# Patient Record
Sex: FEMALE | Race: WHITE | NOT HISPANIC OR LATINO | URBAN - METROPOLITAN AREA
[De-identification: names, ages, dates, MRNs, and addresses within clinical notes are randomized per-mention and may not be internally consistent; named-entity substitution may affect disease eponyms.]

---

## 2017-01-10 ENCOUNTER — INPATIENT (INPATIENT)
Facility: HOSPITAL | Age: 47
LOS: 3 days | Discharge: HOME | End: 2017-01-14
Attending: INTERNAL MEDICINE | Admitting: FAMILY MEDICINE

## 2017-01-24 ENCOUNTER — APPOINTMENT (OUTPATIENT)
Dept: CARDIOLOGY | Facility: CLINIC | Age: 47
End: 2017-01-24

## 2017-04-04 ENCOUNTER — INPATIENT (INPATIENT)
Facility: HOSPITAL | Age: 47
LOS: 3 days | Discharge: HOME | End: 2017-04-08
Attending: FAMILY MEDICINE | Admitting: FAMILY MEDICINE

## 2017-05-04 ENCOUNTER — OUTPATIENT (OUTPATIENT)
Dept: OUTPATIENT SERVICES | Facility: HOSPITAL | Age: 47
LOS: 1 days | Discharge: HOME | End: 2017-05-04

## 2017-06-28 DIAGNOSIS — F33.2 MAJOR DEPRESSIVE DISORDER, RECURRENT SEVERE WITHOUT PSYCHOTIC FEATURES: ICD-10-CM

## 2017-07-07 DIAGNOSIS — F41.9 ANXIETY DISORDER, UNSPECIFIED: ICD-10-CM

## 2017-07-07 DIAGNOSIS — R47.81 SLURRED SPEECH: ICD-10-CM

## 2017-07-07 DIAGNOSIS — R42 DIZZINESS AND GIDDINESS: ICD-10-CM

## 2017-07-07 DIAGNOSIS — K21.9 GASTRO-ESOPHAGEAL REFLUX DISEASE WITHOUT ESOPHAGITIS: ICD-10-CM

## 2017-07-07 DIAGNOSIS — E86.0 DEHYDRATION: ICD-10-CM

## 2017-07-07 DIAGNOSIS — R53.1 WEAKNESS: ICD-10-CM

## 2017-07-07 DIAGNOSIS — R55 SYNCOPE AND COLLAPSE: ICD-10-CM

## 2017-07-07 DIAGNOSIS — D51.0 VITAMIN B12 DEFICIENCY ANEMIA DUE TO INTRINSIC FACTOR DEFICIENCY: ICD-10-CM

## 2017-07-07 DIAGNOSIS — G35 MULTIPLE SCLEROSIS: ICD-10-CM

## 2017-07-07 DIAGNOSIS — I47.1 SUPRAVENTRICULAR TACHYCARDIA: ICD-10-CM

## 2017-07-07 DIAGNOSIS — Z87.891 PERSONAL HISTORY OF NICOTINE DEPENDENCE: ICD-10-CM

## 2017-08-07 ENCOUNTER — APPOINTMENT (OUTPATIENT)
Dept: CARDIOLOGY | Facility: CLINIC | Age: 47
End: 2017-08-07

## 2017-08-07 VITALS — SYSTOLIC BLOOD PRESSURE: 120 MMHG | DIASTOLIC BLOOD PRESSURE: 80 MMHG | HEART RATE: 60 BPM

## 2017-08-07 VITALS — WEIGHT: 162 LBS | BODY MASS INDEX: 24.55 KG/M2 | HEIGHT: 68 IN

## 2017-08-15 ENCOUNTER — EMERGENCY (EMERGENCY)
Facility: HOSPITAL | Age: 47
LOS: 0 days | Discharge: HOME | End: 2017-08-15
Admitting: FAMILY MEDICINE
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
## 2017-08-15 DIAGNOSIS — M54.2 CERVICALGIA: ICD-10-CM

## 2017-08-15 DIAGNOSIS — Z79.899 OTHER LONG TERM (CURRENT) DRUG THERAPY: ICD-10-CM

## 2017-08-15 DIAGNOSIS — F41.9 ANXIETY DISORDER, UNSPECIFIED: ICD-10-CM

## 2017-08-15 DIAGNOSIS — S16.1XXA STRAIN OF MUSCLE, FASCIA AND TENDON AT NECK LEVEL, INITIAL ENCOUNTER: ICD-10-CM

## 2017-08-15 DIAGNOSIS — Z91.012 ALLERGY TO EGGS: ICD-10-CM

## 2017-08-15 DIAGNOSIS — R55 SYNCOPE AND COLLAPSE: ICD-10-CM

## 2017-08-15 DIAGNOSIS — K21.9 GASTRO-ESOPHAGEAL REFLUX DISEASE WITHOUT ESOPHAGITIS: ICD-10-CM

## 2017-08-15 DIAGNOSIS — D51.0 VITAMIN B12 DEFICIENCY ANEMIA DUE TO INTRINSIC FACTOR DEFICIENCY: ICD-10-CM

## 2017-08-15 DIAGNOSIS — Z88.8 ALLERGY STATUS TO OTHER DRUGS, MEDICAMENTS AND BIOLOGICAL SUBSTANCES STATUS: ICD-10-CM

## 2017-08-15 DIAGNOSIS — Z91.013 ALLERGY TO SEAFOOD: ICD-10-CM
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
## 2017-08-15 DIAGNOSIS — G35 MULTIPLE SCLEROSIS: ICD-10-CM
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
## 2017-08-18 ENCOUNTER — TRANSCRIPTION ENCOUNTER (OUTPATIENT)
Age: 47
End: 2017-08-18

## 2017-09-21 ENCOUNTER — APPOINTMENT (OUTPATIENT)
Dept: CARDIOLOGY | Facility: CLINIC | Age: 47
End: 2017-09-21

## 2017-12-14 ENCOUNTER — EMERGENCY (EMERGENCY)
Facility: HOSPITAL | Age: 47
LOS: 0 days | Discharge: HOME | End: 2017-12-14
Admitting: FAMILY MEDICINE
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
## 2017-12-14 DIAGNOSIS — S16.1XXA STRAIN OF MUSCLE, FASCIA AND TENDON AT NECK LEVEL, INITIAL ENCOUNTER: ICD-10-CM
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
## 2017-12-14 DIAGNOSIS — D51.0 VITAMIN B12 DEFICIENCY ANEMIA DUE TO INTRINSIC FACTOR DEFICIENCY: ICD-10-CM

## 2017-12-14 DIAGNOSIS — Z91.013 ALLERGY TO SEAFOOD: ICD-10-CM

## 2017-12-14 DIAGNOSIS — M54.2 CERVICALGIA: ICD-10-CM

## 2017-12-14 DIAGNOSIS — R55 SYNCOPE AND COLLAPSE: ICD-10-CM

## 2017-12-14 DIAGNOSIS — F41.9 ANXIETY DISORDER, UNSPECIFIED: ICD-10-CM

## 2017-12-14 DIAGNOSIS — K21.9 GASTRO-ESOPHAGEAL REFLUX DISEASE WITHOUT ESOPHAGITIS: ICD-10-CM

## 2017-12-14 DIAGNOSIS — G35 MULTIPLE SCLEROSIS: ICD-10-CM
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
## 2017-12-14 DIAGNOSIS — Z91.040 LATEX ALLERGY STATUS: ICD-10-CM

## 2017-12-14 DIAGNOSIS — Z79.899 OTHER LONG TERM (CURRENT) DRUG THERAPY: ICD-10-CM

## 2018-06-11 ENCOUNTER — APPOINTMENT (OUTPATIENT)
Dept: OTOLARYNGOLOGY | Facility: CLINIC | Age: 48
End: 2018-06-11
Payer: COMMERCIAL

## 2018-06-11 DIAGNOSIS — H92.09 OTALGIA, UNSPECIFIED EAR: ICD-10-CM

## 2018-06-11 DIAGNOSIS — R04.0 EPISTAXIS: ICD-10-CM

## 2018-06-11 PROCEDURE — 31231 NASAL ENDOSCOPY DX: CPT

## 2018-06-11 PROCEDURE — 92550 TYMPANOMETRY & REFLEX THRESH: CPT

## 2018-06-11 PROCEDURE — 99204 OFFICE O/P NEW MOD 45 MIN: CPT | Mod: 25

## 2018-06-19 ENCOUNTER — APPOINTMENT (OUTPATIENT)
Dept: CARDIOLOGY | Facility: CLINIC | Age: 48
End: 2018-06-19

## 2018-07-13 ENCOUNTER — APPOINTMENT (OUTPATIENT)
Dept: CARDIOLOGY | Facility: CLINIC | Age: 48
End: 2018-07-13

## 2018-07-13 VITALS — DIASTOLIC BLOOD PRESSURE: 70 MMHG | SYSTOLIC BLOOD PRESSURE: 130 MMHG

## 2018-07-13 VITALS — HEIGHT: 68 IN | HEART RATE: 60 BPM | WEIGHT: 168 LBS | BODY MASS INDEX: 25.46 KG/M2

## 2018-07-13 VITALS — OXYGEN SATURATION: 99 %

## 2018-07-27 ENCOUNTER — APPOINTMENT (OUTPATIENT)
Dept: CARDIOLOGY | Facility: CLINIC | Age: 48
End: 2018-07-27

## 2018-08-14 ENCOUNTER — OTHER (OUTPATIENT)
Age: 48
End: 2018-08-14

## 2018-09-06 ENCOUNTER — OUTPATIENT (OUTPATIENT)
Dept: OUTPATIENT SERVICES | Facility: HOSPITAL | Age: 48
LOS: 1 days | Discharge: HOME | End: 2018-09-06

## 2018-09-06 ENCOUNTER — APPOINTMENT (OUTPATIENT)
Dept: OTOLARYNGOLOGY | Facility: CLINIC | Age: 48
End: 2018-09-06

## 2018-09-07 ENCOUNTER — EMERGENCY (EMERGENCY)
Facility: HOSPITAL | Age: 48
LOS: 0 days | Discharge: HOME | End: 2018-09-08
Attending: EMERGENCY MEDICINE | Admitting: EMERGENCY MEDICINE

## 2018-09-07 VITALS
RESPIRATION RATE: 18 BRPM | SYSTOLIC BLOOD PRESSURE: 165 MMHG | HEART RATE: 91 BPM | TEMPERATURE: 98 F | DIASTOLIC BLOOD PRESSURE: 72 MMHG | OXYGEN SATURATION: 100 %

## 2018-09-07 DIAGNOSIS — Z88.8 ALLERGY STATUS TO OTHER DRUGS, MEDICAMENTS AND BIOLOGICAL SUBSTANCES: ICD-10-CM

## 2018-09-07 DIAGNOSIS — R00.2 PALPITATIONS: ICD-10-CM

## 2018-09-07 DIAGNOSIS — Z91.040 LATEX ALLERGY STATUS: ICD-10-CM

## 2018-09-07 DIAGNOSIS — G35 MULTIPLE SCLEROSIS: ICD-10-CM

## 2018-09-07 DIAGNOSIS — Z91.013 ALLERGY TO SEAFOOD: ICD-10-CM

## 2018-09-07 LAB
ALBUMIN SERPL ELPH-MCNC: 4.3 G/DL — SIGNIFICANT CHANGE UP (ref 3.5–5.2)
ALP SERPL-CCNC: 100 U/L — SIGNIFICANT CHANGE UP (ref 30–115)
ALT FLD-CCNC: 12 U/L — SIGNIFICANT CHANGE UP (ref 0–41)
ANION GAP SERPL CALC-SCNC: 19 MMOL/L — HIGH (ref 7–14)
AST SERPL-CCNC: 17 U/L — SIGNIFICANT CHANGE UP (ref 0–41)
BASOPHILS # BLD AUTO: 0.02 K/UL — SIGNIFICANT CHANGE UP (ref 0–0.2)
BASOPHILS NFR BLD AUTO: 0.3 % — SIGNIFICANT CHANGE UP (ref 0–1)
BILIRUB SERPL-MCNC: 0.4 MG/DL — SIGNIFICANT CHANGE UP (ref 0.2–1.2)
BUN SERPL-MCNC: 11 MG/DL — SIGNIFICANT CHANGE UP (ref 10–20)
CALCIUM SERPL-MCNC: 8.7 MG/DL — SIGNIFICANT CHANGE UP (ref 8.5–10.1)
CHLORIDE SERPL-SCNC: 105 MMOL/L — SIGNIFICANT CHANGE UP (ref 98–110)
CK SERPL-CCNC: 48 U/L — SIGNIFICANT CHANGE UP (ref 0–225)
CO2 SERPL-SCNC: 19 MMOL/L — SIGNIFICANT CHANGE UP (ref 17–32)
CREAT SERPL-MCNC: 0.8 MG/DL — SIGNIFICANT CHANGE UP (ref 0.7–1.5)
EOSINOPHIL # BLD AUTO: 0.01 K/UL — SIGNIFICANT CHANGE UP (ref 0–0.7)
EOSINOPHIL NFR BLD AUTO: 0.2 % — SIGNIFICANT CHANGE UP (ref 0–8)
GLUCOSE SERPL-MCNC: 109 MG/DL — HIGH (ref 70–99)
HCT VFR BLD CALC: 42.1 % — SIGNIFICANT CHANGE UP (ref 37–47)
HGB BLD-MCNC: 14.2 G/DL — SIGNIFICANT CHANGE UP (ref 12–16)
IMM GRANULOCYTES NFR BLD AUTO: 0.3 % — SIGNIFICANT CHANGE UP (ref 0.1–0.3)
LYMPHOCYTES # BLD AUTO: 1.45 K/UL — SIGNIFICANT CHANGE UP (ref 1.2–3.4)
LYMPHOCYTES # BLD AUTO: 23 % — SIGNIFICANT CHANGE UP (ref 20.5–51.1)
MAGNESIUM SERPL-MCNC: 2 MG/DL — SIGNIFICANT CHANGE UP (ref 1.8–2.4)
MCHC RBC-ENTMCNC: 29 PG — SIGNIFICANT CHANGE UP (ref 27–31)
MCHC RBC-ENTMCNC: 33.7 G/DL — SIGNIFICANT CHANGE UP (ref 32–37)
MCV RBC AUTO: 85.9 FL — SIGNIFICANT CHANGE UP (ref 81–99)
MONOCYTES # BLD AUTO: 0.3 K/UL — SIGNIFICANT CHANGE UP (ref 0.1–0.6)
MONOCYTES NFR BLD AUTO: 4.8 % — SIGNIFICANT CHANGE UP (ref 1.7–9.3)
NEUTROPHILS # BLD AUTO: 4.5 K/UL — SIGNIFICANT CHANGE UP (ref 1.4–6.5)
NEUTROPHILS NFR BLD AUTO: 71.4 % — SIGNIFICANT CHANGE UP (ref 42.2–75.2)
NRBC # BLD: 0 /100 WBCS — SIGNIFICANT CHANGE UP (ref 0–0)
PLATELET # BLD AUTO: 160 K/UL — SIGNIFICANT CHANGE UP (ref 130–400)
POTASSIUM SERPL-MCNC: 4.6 MMOL/L — SIGNIFICANT CHANGE UP (ref 3.5–5)
POTASSIUM SERPL-SCNC: 4.6 MMOL/L — SIGNIFICANT CHANGE UP (ref 3.5–5)
PROT SERPL-MCNC: 6.7 G/DL — SIGNIFICANT CHANGE UP (ref 6–8)
RBC # BLD: 4.9 M/UL — SIGNIFICANT CHANGE UP (ref 4.2–5.4)
RBC # FLD: 12.9 % — SIGNIFICANT CHANGE UP (ref 11.5–14.5)
SODIUM SERPL-SCNC: 143 MMOL/L — SIGNIFICANT CHANGE UP (ref 135–146)
TROPONIN T SERPL-MCNC: <0.01 NG/ML — SIGNIFICANT CHANGE UP
TROPONIN T SERPL-MCNC: <0.01 NG/ML — SIGNIFICANT CHANGE UP
WBC # BLD: 6.3 K/UL — SIGNIFICANT CHANGE UP (ref 4.8–10.8)
WBC # FLD AUTO: 6.3 K/UL — SIGNIFICANT CHANGE UP (ref 4.8–10.8)

## 2018-09-07 NOTE — ED CDU PROVIDER INITIAL DAY NOTE - PHYSICAL EXAMINATION
Vital Signs: I have reviewed the initial vital signs.  Constitutional: well-nourished, appears stated age, no acute distress  Cardiovascular: regular rate, regular rhythm, well-perfused extremities  Respiratory: unlabored respiratory effort, clear to auscultation bilaterally  Gastrointestinal: soft, non-tender abdomen, no pulsatile mass  Musculoskeletal: no lower extremity edema, no calf tenderness  Integumentary: warm, dry, no rash  Neurologic: awake, alert, cranial nerves II-XII grossly intact, extremities’ motor and sensory functions grossly intact  Psychiatric: appropriate mood, appropriate affect

## 2018-09-07 NOTE — ED PROVIDER NOTE - OBJECTIVE STATEMENT
46 Y/O F MS, NONSMOKER C/O PALPITATIONS THIS AM. PT FELT "IRREGULAR HEART BEAT." + ALVAREZ X 2 DAYS. PT REPORTS INTERMITTENT "TWINGES" OF CHEST PAIN X SEVERAL WEEKS. NO CP OR SOB CURRENTLY. PT HAD NORMAL STRESS TEST AND CCTA LAST YEAR. PT HAD HOLTER MONITOR WHICH REVEALED A FLUTTER AND SVT AND PT WAS EVALUATED BY EP. ABLATION WAS RECOMMENDED AND PT REFUSED. PT UNDER INCREASED STRESS WHEN HER STEP SON  10 MONTHS AGO. NO LEG EDEMA OR PAIN. NO ABD PAIN OR BACK PAIN.

## 2018-09-07 NOTE — CONSULT NOTE ADULT - SUBJECTIVE AND OBJECTIVE BOX
HPI:  Patient is a 47y old  Female with history of multiple sclerosis, orthostatic hypotension, PSVT/palpitations was told of the need for EP study in the past and possible ablation but has refused, who this morning  developed palpitations and vague CP radiating to neck. It started a 4AM and has been intermittent. She was unable to take medication in the past in view of her low  to low normal BP .     PAST MEDICAL & SURGICAL HISTORY:  Ovarian cyst  Pernicious anemia  MS (multiple sclerosis)      PREVIOUS DIAGNOSTIC TESTING:      ECHO  FINDINGS:7-18 Normal LV systolic function Trace MR Mild TR     CTA of the coronary arteries No CAD CAC score 0    STRESS  FINDINGS:    CATHETERIZATION  FINDINGS:    MEDICATIONS  (STANDING):    MEDICATIONS  (PRN):      FAMILY HISTORY:      SOCIAL HISTORY:  CIGARETTES:    ALCOHOL:    Allergies    epinephrine (Other (Severe))  latex (Other)  shellfish (Other (Moderate))    Intolerances        REVIEW OF SYSTEMS:  CONSTITUTIONAL: No fever, weight loss, or fatigue  EYES: No eye pain, visual disturbances, or discharge  ENMT:  No difficulty hearing, tinnitus, vertigo; No sinus or throat pain  NECK: No pain or stiffness  BREASTS: No pain, masses, or nipple discharge  RESPIRATORY: No cough, wheezing, chills or hemoptysis; No shortness of breath  CARDIOVASCULAR: No chest pain, palpitations, dizziness, or leg swelling  GASTROINTESTINAL: No abdominal or epigastric pain. No nausea, vomiting, or hematemesis; No diarrhea or constipation. No melena or hematochezia.  GENITOURINARY: No dysuria, frequency, hematuria, or incontinence  NEUROLOGICAL: No headaches, memory loss, loss of strength, numbness, or tremors  SKIN: No itching, burning, rashes, or lesions   LYMPH NODES: No enlarged glands  ENDOCRINE: No heat or cold intolerance; No hair loss  MUSCULOSKELETAL: No joint pain or swelling; No muscle, back, or extremity pain  PSYCHIATRIC: No depression, anxiety, mood swings, or difficulty sleeping  HEME/LYMPH: No easy bruising, or bleeding gums  ALLERY AND IMMUNOLOGIC: No hives or eczema          Vital Signs Last 24 Hrs  T(C): 36.6 (07 Sep 2018 07:26), Max: 36.6 (07 Sep 2018 07:26)  T(F): 97.8 (07 Sep 2018 07:26), Max: 97.8 (07 Sep 2018 07:26)  HR: 91 (07 Sep 2018 07:26) (91 - 91)  BP: 165/72 (07 Sep 2018 07:26) (165/72 - 165/72)  BP(mean): --  RR: 18 (07 Sep 2018 07:26) (18 - 18)  SpO2: 100% (07 Sep 2018 07:26) (100% - 100%)        PHYSICAL EXAM:  GENERAL: NAD, well-groomed, well-developed  HEAD:  Atraumatic, Normocephalic  EYES: EOMI, PERRLA, conjunctiva and sclera clear  ENMT: No tonsillar erythema, exudates, or enlargement; Moist mucous membranes, Good dentition, No lesions  NECK: Supple, No JVD, Normal thyroid  NERVOUS SYSTEM:  Alert & Oriented X3, Good concentration; Motor Strength 5/5 B/L upper and lower extremities; DTRs 2+ intact and symmetric  CHEST/LUNG: Clear to percussion bilaterally; No rales, rhonchi, wheezing, or rubs  HEART: Regular rate and rhythm; No murmurs, rubs, or gallops  ABDOMEN: Soft, Nontender, Nondistended; Bowel sounds present  EXTREMITIES:  2+ Peripheral Pulses, No clubbing, cyanosis, or edema  LYMPH: No lymphadenopathy noted  SKIN: No rashes or lesions    INTERPRETATION OF TELEMETRY:    ECG:NSR NS ST-T changes     I&O's Detail      LABS:                        14.2   6.30  )-----------( 160      ( 07 Sep 2018 08:12 )             42.1           CARDIAC MARKERS ( 07 Sep 2018 08:12 )  x     / <0.01 ng/mL / x     / x     / x              I&O's Summary      RADIOLOGY & ADDITIONAL STUDIES:

## 2018-09-07 NOTE — ED PROVIDER NOTE - MEDICAL DECISION MAKING DETAILS
46 Y/O F H/O SVT, REFUSED ABLATION WITH PALPITATIONS AND SOB. EKG WITH NO ARRYTHMIA. ALL LABS REVIEWED. DR. RAMOS EVALUATED PT IN THE ED. PT T BE TRANSFERRED TO [facility] FOR MONITORING.

## 2018-09-07 NOTE — ED CDU PROVIDER INITIAL DAY NOTE - PROGRESS NOTE DETAILS
received signout from Dr. Nunez - pt seen by cardiology Dr. Shepard - plan: ce negative x 2; observe overnight for arrythmias(pt with sx of nsvt in past); if no arrythmias d/c in am for outpt workup;

## 2018-09-07 NOTE — ED PROVIDER NOTE - PROGRESS NOTE DETAILS
PT REFUSED CXR, RISKS EXPLAINED. PT VERBALIZED UNDERSTANDING.  AT BEDSIDE. CASE D/W DR. RAMOS, RECOMMENDS [facility] FOR MONITORING CASE D/W DR. RAMOS, DR. RAMOS IN ED TO EVALUATE PT CASE D/W DR. RENDON, WILL TRANSFER TO [facility]

## 2018-09-07 NOTE — CONSULT NOTE ADULT - ASSESSMENT
-Palpitations - intermittent. In NSR in ER . CE negative x1. No CAD at recent CTA of the coronary arteries . She has suspected PSVT in the past. She has refused EP and possible ablation   -Multiple Sclerosis  -Anxiety   -Known hiatal hernia     Plan:  Monitor in observation  If no arrythmia overnight may discharge in AM with Long term event monitor as outpatient  Discussed with ER staff

## 2018-09-07 NOTE — ED ADULT NURSE REASSESSMENT NOTE - NS ED NURSE REASSESS COMMENT FT1
Pt assessed, VSS, no complaints offered at this time, pt is currently on continuous cardiac monitoring with HR of 72. Pt is a&ox4, steady gait neuro intact. safety measures in place, call bell at bedside will monitor.

## 2018-09-07 NOTE — ED CDU PROVIDER INITIAL DAY NOTE - OBJECTIVE STATEMENT
Pt is a 48yo female with palpitations for a few hours today since 4AM.  Now resolved.  Has hx of short palpitations.  Neg CCTA/stress test last year (Dr. Avitia).  Seen in ED by Dr. Avitia and recommends observation until AM.

## 2018-09-08 VITALS
RESPIRATION RATE: 18 BRPM | TEMPERATURE: 98 F | HEART RATE: 64 BPM | SYSTOLIC BLOOD PRESSURE: 123 MMHG | DIASTOLIC BLOOD PRESSURE: 80 MMHG

## 2018-09-08 NOTE — ED CDU PROVIDER SUBSEQUENT DAY NOTE - PROGRESS NOTE DETAILS
Sign out received from PA Fears. Pt stable, resting comfortably. Denies any symptoms; palpitations/chest pain. Results discussed with pt. Understands to follow up with Dr. Rodriguez for holter monitoring.

## 2018-09-08 NOTE — ED CDU PROVIDER SUBSEQUENT DAY NOTE - ATTENDING CONTRIBUTION TO CARE
Pt has a long history of palpitations. recently under a lot of stress related to friend who is sick. Pt crying when she discusses this. No chest pain. recently worsening palpitations. No shortness of breath. Placed into observation. S1S2 rrr, lungs clear, abdomen is soft nontender, ext neg for edema or tenderness. Hx of SVT in the past.

## 2018-09-08 NOTE — ED ADULT NURSE REASSESSMENT NOTE - NS ED NURSE REASSESS COMMENT FT1
Pt assessed A/o times 4 Vs stable on cardiac monitor denies no chest pain no SOB no N.V, no dizziness  ambulate steady Ed attending on rounds made aware , on going  nursing observation .

## 2018-09-08 NOTE — ED CDU PROVIDER DISPOSITION NOTE - CLINICAL COURSE
Pt under tremendous stress. Not sleeping and not eating well. Presents with exacerbation of palpitation. Placed into observation. While in observation pt has been on continuous cardiac monitoring. Serial cardiac markers neg. Labs normal. Seen by Dr. Avitia who advised monitoring overnight. NO events. Pt states she felt better after hydration. Will follow up out patient with Dr. Avitia for holter vs event monitor. Last echo was July.

## 2018-09-08 NOTE — ED CDU PROVIDER SUBSEQUENT DAY NOTE - HISTORY
pt resting in obs without complaints; pt in obs for cardiac monitoring due to palpitations with hx of nsvtach in past; no arrrythmias overnight; plan: d/c home to follow up with Dr. Shepard for long term holter monitor.

## 2018-09-08 NOTE — ED CDU PROVIDER SUBSEQUENT DAY NOTE - MEDICAL DECISION MAKING DETAILS
Pt seen by Dr. Avitia- Recent Echo-no sign findings. Monitor checked this am no events. All reports reviewed with pt. Pt advised to follow up Dr. Avitia.

## 2018-09-10 ENCOUNTER — CLINICAL ADVICE (OUTPATIENT)
Age: 48
End: 2018-09-10

## 2018-09-13 ENCOUNTER — APPOINTMENT (OUTPATIENT)
Dept: CARDIOLOGY | Facility: CLINIC | Age: 48
End: 2018-09-13

## 2018-09-13 PROBLEM — N83.209 UNSPECIFIED OVARIAN CYST, UNSPECIFIED SIDE: Chronic | Status: ACTIVE | Noted: 2018-09-07

## 2018-09-13 PROBLEM — D51.0 VITAMIN B12 DEFICIENCY ANEMIA DUE TO INTRINSIC FACTOR DEFICIENCY: Chronic | Status: ACTIVE | Noted: 2018-09-07

## 2018-09-13 PROBLEM — G35 MULTIPLE SCLEROSIS: Chronic | Status: ACTIVE | Noted: 2018-09-07

## 2018-09-27 ENCOUNTER — OUTPATIENT (OUTPATIENT)
Dept: OUTPATIENT SERVICES | Facility: HOSPITAL | Age: 48
LOS: 1 days | Discharge: HOME | End: 2018-09-27

## 2018-09-27 DIAGNOSIS — R55 SYNCOPE AND COLLAPSE: ICD-10-CM

## 2018-10-16 ENCOUNTER — LABORATORY RESULT (OUTPATIENT)
Age: 48
End: 2018-10-16

## 2018-10-19 ENCOUNTER — APPOINTMENT (OUTPATIENT)
Dept: CARDIOLOGY | Facility: CLINIC | Age: 48
End: 2018-10-19

## 2018-10-19 VITALS
WEIGHT: 171 LBS | HEART RATE: 62 BPM | BODY MASS INDEX: 25.91 KG/M2 | DIASTOLIC BLOOD PRESSURE: 80 MMHG | HEIGHT: 68 IN | SYSTOLIC BLOOD PRESSURE: 120 MMHG

## 2018-10-19 DIAGNOSIS — D51.0 VITAMIN B12 DEFICIENCY ANEMIA DUE TO INTRINSIC FACTOR DEFICIENCY: ICD-10-CM

## 2019-01-16 DIAGNOSIS — G35 MULTIPLE SCLEROSIS: ICD-10-CM

## 2019-01-16 DIAGNOSIS — D51.0 VITAMIN B12 DEFICIENCY ANEMIA DUE TO INTRINSIC FACTOR DEFICIENCY: ICD-10-CM

## 2019-01-16 DIAGNOSIS — Z87.891 PERSONAL HISTORY OF NICOTINE DEPENDENCE: ICD-10-CM

## 2019-01-16 DIAGNOSIS — R00.2 PALPITATIONS: ICD-10-CM

## 2019-04-19 ENCOUNTER — APPOINTMENT (OUTPATIENT)
Dept: CARDIOLOGY | Facility: CLINIC | Age: 49
End: 2019-04-19
Payer: COMMERCIAL

## 2019-04-19 VITALS — HEART RATE: 64 BPM | BODY MASS INDEX: 25.76 KG/M2 | WEIGHT: 170 LBS | HEIGHT: 68 IN

## 2019-04-19 VITALS — DIASTOLIC BLOOD PRESSURE: 70 MMHG | SYSTOLIC BLOOD PRESSURE: 104 MMHG

## 2019-04-19 DIAGNOSIS — H93.19 TINNITUS, UNSPECIFIED EAR: ICD-10-CM

## 2019-04-19 DIAGNOSIS — I49.9 CARDIAC ARRHYTHMIA, UNSPECIFIED: ICD-10-CM

## 2019-04-19 PROCEDURE — 93000 ELECTROCARDIOGRAM COMPLETE: CPT

## 2019-04-19 PROCEDURE — 99214 OFFICE O/P EST MOD 30 MIN: CPT

## 2019-04-19 NOTE — REASON FOR VISIT
[Follow-Up - Clinic] : a clinic follow-up of [Chest Pain] : chest pain [Hypertension] : hypertension [Palpitations] : palpitations

## 2019-04-19 NOTE — HISTORY OF PRESENT ILLNESS
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 She continues to be stressed at work,. She has palpitations but not as bad as previously She was told of possible treatment of Ocrevus . She continues to have atypical chest pain . Recent CTA showed no CAD . Had long term event monitor which was negative.

## 2019-04-19 NOTE — PHYSICAL EXAM
[General Appearance - Well Developed] : well developed [Normal Appearance] : normal appearance [General Appearance - Well Nourished] : well nourished [Well Groomed] : well groomed [No Deformities] : no deformities [General Appearance - In No Acute Distress] : no acute distress [Normal Conjunctiva] : the conjunctiva exhibited no abnormalities [Eyelids - No Xanthelasma] : the eyelids demonstrated no xanthelasmas [Normal Oral Mucosa] : normal oral mucosa [No Oral Pallor] : no oral pallor [No Oral Cyanosis] : no oral cyanosis [FreeTextEntry1] : No JVD  [Respiration, Rhythm And Depth] : normal respiratory rhythm and effort [Exaggerated Use Of Accessory Muscles For Inspiration] : no accessory muscle use [Auscultation Breath Sounds / Voice Sounds] : lungs were clear to auscultation bilaterally [Abdomen Soft] : soft [Abdomen Tenderness] : non-tender [Abdomen Mass (___ Cm)] : no abdominal mass palpated [Abnormal Walk] : normal gait [Nail Clubbing] : no clubbing of the fingernails [Cyanosis, Localized] : no localized cyanosis [Petechial Hemorrhages (___cm)] : no petechial hemorrhages [Skin Color & Pigmentation] : normal skin color and pigmentation [] : no rash [No Venous Stasis] : no venous stasis [Skin Lesions] : no skin lesions [No Skin Ulcers] : no skin ulcer [No Xanthoma] : no  xanthoma was observed [Affect] : the affect was normal [Oriented To Time, Place, And Person] : oriented to person, place, and time [Mood] : the mood was normal [No Anxiety] : not feeling anxious [Normal Rate] : normal [No Murmur] : no murmurs heard [Rhythm Regular] : regular [1+] : right 1+ [No Pitting Edema] : no pitting edema present

## 2019-04-19 NOTE — ASSESSMENT
[FreeTextEntry1] : The patient has had less palpitations. SHe has atypical chest pain which is not thought to be cardiac in origin Long term event monitor showed no arrhythmias . SHe is currently on no medication for MS but she was old of a biologic which she is deciding if she should take it .

## 2019-04-19 NOTE — REVIEW OF SYSTEMS
[Feeling Fatigued] : feeling fatigued [Seeing Double (Diplopia)] : no diplopia [Eyeglasses] : currently wearing eyeglasses [Sinus Pressure] : sinus pressure [Shortness Of Breath] : shortness of breath [Chest Pain] : no chest pain [Palpitations] : no palpitations [Dizziness] : no dizziness [Joint Pain] : joint pain [Numbness (Hypesthesia)] : numbness [Tingling (Paresthesia)] : tingling [Anxiety] : anxiety [Under Stress] : under stress [Negative] : Genitourinary

## 2019-07-18 ENCOUNTER — APPOINTMENT (OUTPATIENT)
Dept: NEUROLOGY | Facility: CLINIC | Age: 49
End: 2019-07-18
Payer: COMMERCIAL

## 2019-07-18 VITALS
BODY MASS INDEX: 25.61 KG/M2 | SYSTOLIC BLOOD PRESSURE: 128 MMHG | HEIGHT: 68 IN | DIASTOLIC BLOOD PRESSURE: 86 MMHG | HEART RATE: 65 BPM | WEIGHT: 169 LBS

## 2019-07-18 PROCEDURE — 99214 OFFICE O/P EST MOD 30 MIN: CPT

## 2019-07-18 NOTE — HISTORY OF PRESENT ILLNESS
[FreeTextEntry1] : Ms. Bolden is a 48-year-old woman last seen by me on 1/31/2019, for follow-up of multiple sclerosis as well as cervical radiculopathy.  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  She no longer has the urinary incontinence issues but has more back pain.   She is also getting severe spasm on her back and side as well as her lower back.  She has tried Flexeril and Flexeril appears to be too strong for her. She is also having few months of thoracic burning sensation on the left.  She saw MS specialist in [facility] and was recommended to take occrevus but has not taken it.

## 2019-07-18 NOTE — REVIEW OF SYSTEMS
[Feeling Poorly] : feeling poorly [Memory Lapses or Loss] : memory loss [Leg Weakness] : leg weakness [Poor Coordination] : poor coordination [Abnormal Sensation] : an abnormal sensation [Hypersensitivity] : hypersensitivity [Difficulty Walking] : difficulty walking [Depression] : depression [Incontinence] : incontinence [Arthralgias] : arthralgias [Negative] : Endocrine

## 2019-07-18 NOTE — PHYSICAL EXAM
[FreeTextEntry1] : She is alert and oriented to person, place, and time.  Language and attention are normal. \par Cranial nerves II through XII are normal.  \par Power is 5/5 in the extremities. except mahnaz egive way in left hip flexion and left shoulder abduction\par Sensory exam is symmetric to pinprick, temperature, and vibration.  \par Finger-to-nose is normal.\par Gait appears to be slow, and antalgic related to hip\par

## 2019-07-18 NOTE — DISCUSSION/SUMMARY
[FreeTextEntry1] : Ms. Bolden is a 48-year-old woman with multiple sclerosis with some new urinary issues as well as mid back spasms and pain and spasms that go around from her back to her chest described as an MS hug.  At this point, we will do an MRI of thoracic spine and LS spine\par 1. MRI T+LS spine\par 2. Patient will consider occrevus\par 3. f/u in 4 months

## 2019-07-22 ENCOUNTER — RECORD ABSTRACTING (OUTPATIENT)
Age: 49
End: 2019-07-22

## 2019-07-22 DIAGNOSIS — N64.4 MASTODYNIA: ICD-10-CM

## 2019-07-22 DIAGNOSIS — Z32.01 ENCOUNTER FOR PREGNANCY TEST, RESULT POSITIVE: ICD-10-CM

## 2019-07-22 LAB — CYTOLOGY CVX/VAG DOC THIN PREP: NORMAL

## 2019-07-25 ENCOUNTER — APPOINTMENT (OUTPATIENT)
Dept: OBGYN | Facility: CLINIC | Age: 49
End: 2019-07-25

## 2019-10-21 ENCOUNTER — APPOINTMENT (OUTPATIENT)
Dept: CARDIOLOGY | Facility: CLINIC | Age: 49
End: 2019-10-21

## 2019-12-16 NOTE — ED PROVIDER NOTE - NSCAREINITIATED _GEN_ER
Susan Woodruff(Attending) Is This A New Presentation, Or A Follow-Up?: Skin Lesions Additional History: Here for skin check, no spots of concern. No H/O skin cancer. Small raised dark spot on right lower leg - present 3 years, sometimes gets in the way of shaving.

## 2019-12-23 ENCOUNTER — APPOINTMENT (OUTPATIENT)
Dept: NEUROLOGY | Facility: CLINIC | Age: 49
End: 2019-12-23
Payer: COMMERCIAL

## 2019-12-23 VITALS
OXYGEN SATURATION: 98 % | SYSTOLIC BLOOD PRESSURE: 120 MMHG | DIASTOLIC BLOOD PRESSURE: 79 MMHG | WEIGHT: 169 LBS | BODY MASS INDEX: 25.61 KG/M2 | HEART RATE: 76 BPM | HEIGHT: 68 IN

## 2019-12-23 DIAGNOSIS — Z87.891 PERSONAL HISTORY OF NICOTINE DEPENDENCE: ICD-10-CM

## 2019-12-23 PROCEDURE — 99214 OFFICE O/P EST MOD 30 MIN: CPT

## 2019-12-23 NOTE — REVIEW OF SYSTEMS
[Confused or Disoriented] : confusion [Memory Lapses or Loss] : memory loss [Numbness] : numbness [Decr. Concentrating Ability] : decreased concentrating ability [Tension Headache] : tension-type headaches [Eye Pain] : eye pain [Depression] : depression [Difficulty Walking] : difficulty walking [Negative] : Respiratory

## 2019-12-23 NOTE — DISCUSSION/SUMMARY
[FreeTextEntry1] : Ms. Bolden is a 49-year-old woman with multiple sclerosis with worsening cognitive complaints which maybe related to depression and increased stress.  However given her diagnosis of MS and significant confusion would repeat MRI brain and resend for neuropsych testing\par 1. MRI brain w/o RULA\par 2. Neuropsych testing\par 3. Recommended seeing a therapist or psychiatrist\par 4. f/u in 6 months

## 2019-12-23 NOTE — PHYSICAL EXAM
[FreeTextEntry1] : She is alert and oriented to person, place, and time. Language and attention are normal. \par Cranial nerves II through XII are normal. \par Power is 5/5 in the extremities. except mahnaz egive way in left hip flexion and left shoulder abduction\par Sensory exam is symmetric to pinprick, temperature, and vibration. \par Finger-to-nose is normal.\par Gait appears to be slow, and antalgic related to hip

## 2019-12-23 NOTE — HISTORY OF PRESENT ILLNESS
[FreeTextEntry1] : Ms. Bolden is a 49-year-old woman last seen by me on 2019, for follow-up of multiple sclerosis as well as cervical radiculopathy. Since the last time I saw her,I had her go for an MRI of thoracic and lumbar spine which showed stable demyelinating lesion in the thoracic cord and central disc bulge in lumbar spine.  Her biggest ocmplaint this visit is her memory and cogntive issues.  She has been forgetting peoples names and event got confused with her  who he was thinking that he was his brother.  She also has forgotten the tea on the stove and gotten confused on how to get home and sometimes not event recognize her own home.  She is more stressed then she has been and her 2 nephews recent  in Sept and 2019 which added with the death of her son 2 years ago has made her very depressed.  She has a hard time maintaining focus.\par She never completed the neuropsych testing i sent her for 2 years ago

## 2020-01-14 ENCOUNTER — FORM ENCOUNTER (OUTPATIENT)
Age: 50
End: 2020-01-14

## 2020-02-12 ENCOUNTER — OUTPATIENT (OUTPATIENT)
Dept: OUTPATIENT SERVICES | Facility: HOSPITAL | Age: 50
LOS: 1 days | Discharge: HOME | End: 2020-02-12

## 2020-02-12 DIAGNOSIS — G31.84 MILD COGNITIVE IMPAIRMENT OF UNCERTAIN OR UNKNOWN ETIOLOGY: ICD-10-CM

## 2020-02-26 ENCOUNTER — APPOINTMENT (OUTPATIENT)
Dept: CARDIOLOGY | Facility: CLINIC | Age: 50
End: 2020-02-26
Payer: COMMERCIAL

## 2020-02-26 VITALS
DIASTOLIC BLOOD PRESSURE: 80 MMHG | BODY MASS INDEX: 25.61 KG/M2 | SYSTOLIC BLOOD PRESSURE: 120 MMHG | HEIGHT: 68 IN | HEART RATE: 56 BPM | WEIGHT: 169 LBS

## 2020-02-26 PROCEDURE — 93000 ELECTROCARDIOGRAM COMPLETE: CPT

## 2020-02-26 PROCEDURE — 99214 OFFICE O/P EST MOD 30 MIN: CPT

## 2020-02-26 NOTE — REVIEW OF SYSTEMS
[Seeing Double (Diplopia)] : no diplopia [Feeling Fatigued] : feeling fatigued [Eyeglasses] : currently wearing eyeglasses [Chest Pain] : no chest pain [Shortness Of Breath] : shortness of breath [Sinus Pressure] : sinus pressure [Dizziness] : no dizziness [Joint Pain] : joint pain [Palpitations] : no palpitations [Tingling (Paresthesia)] : tingling [Numbness (Hypesthesia)] : numbness [Anxiety] : anxiety [Under Stress] : under stress [Negative] : Genitourinary

## 2020-02-26 NOTE — HISTORY OF PRESENT ILLNESS
[FreeTextEntry1] : The patient has had intermittent chest pain . Left upper chest , on exertion . It is associated with ALVAREZ .

## 2020-02-26 NOTE — PHYSICAL EXAM
[Normal Appearance] : normal appearance [General Appearance - Well Developed] : well developed [Well Groomed] : well groomed [General Appearance - Well Nourished] : well nourished [No Deformities] : no deformities [General Appearance - In No Acute Distress] : no acute distress [Normal Oral Mucosa] : normal oral mucosa [Eyelids - No Xanthelasma] : the eyelids demonstrated no xanthelasmas [Normal Conjunctiva] : the conjunctiva exhibited no abnormalities [FreeTextEntry1] : No JVD  [No Oral Pallor] : no oral pallor [No Oral Cyanosis] : no oral cyanosis [Exaggerated Use Of Accessory Muscles For Inspiration] : no accessory muscle use [Respiration, Rhythm And Depth] : normal respiratory rhythm and effort [Auscultation Breath Sounds / Voice Sounds] : lungs were clear to auscultation bilaterally [Abdomen Tenderness] : non-tender [Abdomen Soft] : soft [Abnormal Walk] : normal gait [Nail Clubbing] : no clubbing of the fingernails [Abdomen Mass (___ Cm)] : no abdominal mass palpated [Petechial Hemorrhages (___cm)] : no petechial hemorrhages [Cyanosis, Localized] : no localized cyanosis [No Venous Stasis] : no venous stasis [Skin Color & Pigmentation] : normal skin color and pigmentation [] : no rash [No Skin Ulcers] : no skin ulcer [Skin Lesions] : no skin lesions [Affect] : the affect was normal [Oriented To Time, Place, And Person] : oriented to person, place, and time [No Xanthoma] : no  xanthoma was observed [Mood] : the mood was normal [No Anxiety] : not feeling anxious [Rhythm Regular] : regular [No Murmur] : no murmurs heard [Normal Rate] : normal [1+] : left 1+ [No Pitting Edema] : no pitting edema present

## 2020-02-26 NOTE — REASON FOR VISIT
[Follow-Up - Clinic] : a clinic follow-up of [Hypertension] : hypertension [Palpitations] : palpitations [Chest Pain] : chest pain

## 2020-02-26 NOTE — ASSESSMENT
[FreeTextEntry1] : The patient had a viral syndrome in Nov and Dec. She now has had left arm and left upper chest pain . This is at rest andopn exertion . CTA showed no CAD in 2017 , less likely symptoms from cardiac etiology. Possibly from cervical spine issues

## 2020-03-22 ENCOUNTER — FORM ENCOUNTER (OUTPATIENT)
Age: 50
End: 2020-03-22

## 2020-04-13 ENCOUNTER — APPOINTMENT (OUTPATIENT)
Dept: NEUROLOGY | Facility: CLINIC | Age: 50
End: 2020-04-13
Payer: COMMERCIAL

## 2020-04-13 PROCEDURE — 99442: CPT

## 2020-04-20 ENCOUNTER — APPOINTMENT (OUTPATIENT)
Dept: NEUROLOGY | Facility: CLINIC | Age: 50
End: 2020-04-20

## 2020-08-06 ENCOUNTER — APPOINTMENT (OUTPATIENT)
Dept: NEUROLOGY | Facility: CLINIC | Age: 50
End: 2020-08-06
Payer: COMMERCIAL

## 2020-08-06 PROCEDURE — 99214 OFFICE O/P EST MOD 30 MIN: CPT | Mod: 95

## 2020-08-06 NOTE — PHYSICAL EXAM
[FreeTextEntry1] : a+Ox3 language and attention normal\par NO facial tongue midline EOMI\par NO drift but tremor in right hand when outstretched\par GLEN symmetric\par FTN NL\par Decreased sensation on right extremities\par Gait slightly antaligc vs hemiparetic\par

## 2020-08-06 NOTE — HISTORY OF PRESENT ILLNESS
[FreeTextEntry1] : Ms. Bolden is a 50yo woman being seen for follow up of her multiple sclerosis and cervical radiculopathy.  Her MS was relatively stable up until a few weeks ago where she began having worsening of her balance and worsening of pain radiating down her whole spine.  She is having spasms in her back and b/l LE R>L.  She wakes in morning with difficulty ambulating which takes a few hours to improve.  She had neuropsych testing done and report is not available but she says she had some problems that were either related to her mood or her MS

## 2020-08-26 ENCOUNTER — APPOINTMENT (OUTPATIENT)
Dept: CARDIOLOGY | Facility: CLINIC | Age: 50
End: 2020-08-26
Payer: COMMERCIAL

## 2020-08-26 PROCEDURE — 93325 DOPPLER ECHO COLOR FLOW MAPG: CPT

## 2020-08-26 PROCEDURE — 93320 DOPPLER ECHO COMPLETE: CPT

## 2020-08-26 PROCEDURE — 93351 STRESS TTE COMPLETE: CPT

## 2020-08-31 ENCOUNTER — TRANSCRIPTION ENCOUNTER (OUTPATIENT)
Age: 50
End: 2020-08-31

## 2020-09-14 ENCOUNTER — APPOINTMENT (OUTPATIENT)
Dept: CARDIOLOGY | Facility: CLINIC | Age: 50
End: 2020-09-14
Payer: COMMERCIAL

## 2020-09-14 VITALS
WEIGHT: 167 LBS | DIASTOLIC BLOOD PRESSURE: 68 MMHG | SYSTOLIC BLOOD PRESSURE: 110 MMHG | BODY MASS INDEX: 25.31 KG/M2 | HEART RATE: 64 BPM | HEIGHT: 68 IN | TEMPERATURE: 97.6 F

## 2020-09-14 DIAGNOSIS — I07.1 RHEUMATIC TRICUSPID INSUFFICIENCY: ICD-10-CM

## 2020-09-14 DIAGNOSIS — N89.8 OTHER SPECIFIED NONINFLAMMATORY DISORDERS OF VAGINA: ICD-10-CM

## 2020-09-14 PROCEDURE — 99214 OFFICE O/P EST MOD 30 MIN: CPT

## 2020-09-14 PROCEDURE — 93000 ELECTROCARDIOGRAM COMPLETE: CPT

## 2020-09-14 RX ORDER — BACLOFEN 10 MG/1
10 TABLET ORAL
Qty: 30 | Refills: 1 | Status: DISCONTINUED | COMMUNITY
Start: 2020-08-06 | End: 2020-09-14

## 2020-09-14 NOTE — ASSESSMENT
[FreeTextEntry1] : The patient had atypical chest pain . She had an ETT echo which showed no ischemia at moderate exercise load. The patient had a CTA 3 years ago with no CAD . Sometimes after exercise she notices a slow HR and notices that her chest is pounding . Suspect post exercise vagal reaction. This did not happen on ETT echo .

## 2020-09-14 NOTE — PHYSICAL EXAM
[General Appearance - Well Developed] : well developed [Normal Appearance] : normal appearance [Well Groomed] : well groomed [No Deformities] : no deformities [General Appearance - Well Nourished] : well nourished [General Appearance - In No Acute Distress] : no acute distress [Normal Conjunctiva] : the conjunctiva exhibited no abnormalities [Eyelids - No Xanthelasma] : the eyelids demonstrated no xanthelasmas [Normal Oral Mucosa] : normal oral mucosa [No Oral Pallor] : no oral pallor [No Oral Cyanosis] : no oral cyanosis [FreeTextEntry1] : No JVD  [Respiration, Rhythm And Depth] : normal respiratory rhythm and effort [Auscultation Breath Sounds / Voice Sounds] : lungs were clear to auscultation bilaterally [Exaggerated Use Of Accessory Muscles For Inspiration] : no accessory muscle use [Abdomen Soft] : soft [Abdomen Tenderness] : non-tender [Abdomen Mass (___ Cm)] : no abdominal mass palpated [Nail Clubbing] : no clubbing of the fingernails [Abnormal Walk] : normal gait [Cyanosis, Localized] : no localized cyanosis [Petechial Hemorrhages (___cm)] : no petechial hemorrhages [Skin Color & Pigmentation] : normal skin color and pigmentation [No Venous Stasis] : no venous stasis [Skin Lesions] : no skin lesions [] : no rash [No Skin Ulcers] : no skin ulcer [No Xanthoma] : no  xanthoma was observed [Oriented To Time, Place, And Person] : oriented to person, place, and time [Affect] : the affect was normal [Mood] : the mood was normal [No Anxiety] : not feeling anxious [Rhythm Regular] : regular [No Murmur] : no murmurs heard [Normal Rate] : normal [1+] : left 1+ [No Pitting Edema] : no pitting edema present

## 2020-09-14 NOTE — REVIEW OF SYSTEMS
[Feeling Fatigued] : feeling fatigued [Seeing Double (Diplopia)] : no diplopia [Eyeglasses] : currently wearing eyeglasses [Sinus Pressure] : sinus pressure [Shortness Of Breath] : shortness of breath [Chest Pain] : no chest pain [Palpitations] : no palpitations [Joint Pain] : joint pain [Dizziness] : no dizziness [Numbness (Hypesthesia)] : numbness [Tingling (Paresthesia)] : tingling [Anxiety] : anxiety [Under Stress] : under stress [Negative] : Integumentary

## 2021-01-11 ENCOUNTER — APPOINTMENT (OUTPATIENT)
Dept: OBGYN | Facility: CLINIC | Age: 51
End: 2021-01-11
Payer: COMMERCIAL

## 2021-01-11 VITALS
DIASTOLIC BLOOD PRESSURE: 76 MMHG | TEMPERATURE: 97.8 F | BODY MASS INDEX: 25.7 KG/M2 | SYSTOLIC BLOOD PRESSURE: 118 MMHG | WEIGHT: 169 LBS

## 2021-01-11 DIAGNOSIS — R10.31 RIGHT LOWER QUADRANT PAIN: ICD-10-CM

## 2021-01-11 DIAGNOSIS — N39.0 URINARY TRACT INFECTION, SITE NOT SPECIFIED: ICD-10-CM

## 2021-01-11 PROCEDURE — 99072 ADDL SUPL MATRL&STAF TM PHE: CPT

## 2021-01-11 PROCEDURE — 99396 PREV VISIT EST AGE 40-64: CPT

## 2021-01-11 NOTE — HISTORY OF PRESENT ILLNESS
[FreeTextEntry1] : Patient is 50 years old para 1-0-0-1 last menstrual period July 2018\par She denies postmenopausal bleeding\par Patient notes intermittent right lower quadrant pain for approximately 2 to 3 weeks\par She also complains of occasional hot flashes\par Urine dip notes leukocytes and microscopic hematuria

## 2021-01-11 NOTE — DISCUSSION/SUMMARY
[FreeTextEntry1] : Pap done\par Self breast exam stressed\par Prescribed pelvic ultrasound\par Prescribed bilateral screening mammogram\par Prescribed Cipro 500 mg twice daily x7 days\par

## 2021-01-11 NOTE — PHYSICAL EXAM
[Appropriately responsive] : appropriately responsive [Alert] : alert [No Acute Distress] : no acute distress [No Lymphadenopathy] : no lymphadenopathy [Regular Rate Rhythm] : regular rate rhythm [No Murmurs] : no murmurs [Clear to Auscultation B/L] : clear to auscultation bilaterally [Soft] : soft [Non-tender] : non-tender [Non-distended] : non-distended [No HSM] : No HSM [No Lesions] : no lesions [No Mass] : no mass [Oriented x3] : oriented x3 [Examination Of The Breasts] : a normal appearance [No Masses] : no breast masses were palpable [Labia Minora] : normal [Labia Majora] : normal [Normal] : normal [Uterine Adnexae] : normal

## 2021-04-30 ENCOUNTER — APPOINTMENT (OUTPATIENT)
Dept: NEUROLOGY | Facility: CLINIC | Age: 51
End: 2021-04-30
Payer: COMMERCIAL

## 2021-04-30 VITALS
OXYGEN SATURATION: 100 % | HEART RATE: 70 BPM | BODY MASS INDEX: 25.76 KG/M2 | TEMPERATURE: 97.8 F | DIASTOLIC BLOOD PRESSURE: 84 MMHG | WEIGHT: 170 LBS | HEIGHT: 68 IN | SYSTOLIC BLOOD PRESSURE: 129 MMHG

## 2021-04-30 DIAGNOSIS — R29.898 OTHER SYMPTOMS AND SIGNS INVOLVING THE MUSCULOSKELETAL SYSTEM: ICD-10-CM

## 2021-04-30 DIAGNOSIS — M25.511 PAIN IN RIGHT SHOULDER: ICD-10-CM

## 2021-04-30 PROCEDURE — 99213 OFFICE O/P EST LOW 20 MIN: CPT

## 2021-04-30 PROCEDURE — 99072 ADDL SUPL MATRL&STAF TM PHE: CPT

## 2021-04-30 NOTE — PHYSICAL EXAM
[FreeTextEntry1] : A+Ox3 language and attention normal\par NO facial tongue midline EOMI\par NO drift but tremor in right hand when outstretched\par Difficulty lifting RUE against gravity \par GLEN symmetric\par FTN NL\par Decreased sensation of Left lateral tibial region \par Gait slightly antalgic vs hemiparetic\par \par

## 2021-04-30 NOTE — DISCUSSION/SUMMARY
[Check Labs] : check labs [Follow-Up PMD] : follow up PMD [FreeTextEntry1] : Ms. Bolden is a 48 yo woman with MS who presents with new onset weakness and spasms of RUE after her COVID vaccination. There is some redness of the RUE and swelling possible due to injection of COVID vaccination into the RUE bursa?. The COVID vaccination also could have exacerbated her MS. \par \par PLAN:\par 1. B/l UE Duplex\par 2. Encouraged exercise and stretching\par 3. Referral to orthopedic for right shoulder pain and getting MRI of right shoulder \par 4. Lab work for ferritin, iron, ESR, CRP\par

## 2021-04-30 NOTE — REASON FOR VISIT
[Follow-Up: _____] : a [unfilled] follow-up visit [FreeTextEntry1] : for MS and new onset weakness and spasms of RUE

## 2021-04-30 NOTE — HISTORY OF PRESENT ILLNESS
[FreeTextEntry1] : Ms. Bolden is a 50 yo woman who presents of follow up for her MS and cervical radiculoapthy.  She was last seen in clinic on 8/2020 and at that time was asked to start Baclofen 5 BID and increase exercise and stretching. \par \par Today, patient presents to clinic with primary c/o of pain and weakness of her RUE where she cannot drive or lift a cup to drink.  She also states that her RLE is giving out.  She injured the RLE after falling on ice in December in where is landed in a split.  She stopped taking Baclofen because she didn't feel good. She got her COVID vaccine on 2/23/21 and 3/23/21. She had n/v/f after the first dose and was very sick x5 days after second dose and still has severe fatigue. 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 The back spasms remain status quo. She continues to wake up with difficulty ambulating in the morning. She also complains of an episode of expressive aphasia after her first dose of the COVID vaccine in February.  We still do not have her NPT report. \par \par \par \par \par

## 2021-04-30 NOTE — END OF VISIT
[Time Spent: ___ minutes] : I have spent [unfilled] minutes of time on the encounter. [FreeTextEntry3] : Patient seen and examined with RICCI Parker and agree with above except as noted.  Patient has beenhaving multiple issues since she received her COVID vaccine (Moderna) most notable severe pain in her right shoulder and elbow.  She also had worsening of her prior MS symptoms with right sided weakness and spasms which has slowly improved.  Likely she had a pseudoexacerbation related to the vaccine.  She may also have had a high vaccine injection as she is having shoulder complaints suggestive of irritation at the shoulder joint/capsule.\par \par Plan as above

## 2021-05-11 ENCOUNTER — RESULT REVIEW (OUTPATIENT)
Age: 51
End: 2021-05-11

## 2021-05-11 ENCOUNTER — OUTPATIENT (OUTPATIENT)
Dept: OUTPATIENT SERVICES | Facility: HOSPITAL | Age: 51
LOS: 1 days | Discharge: HOME | End: 2021-05-11
Payer: MEDICARE

## 2021-05-11 PROCEDURE — 93971 EXTREMITY STUDY: CPT | Mod: 26,RT

## 2021-05-12 ENCOUNTER — NON-APPOINTMENT (OUTPATIENT)
Age: 51
End: 2021-05-12

## 2021-05-19 DIAGNOSIS — M79.601 PAIN IN RIGHT ARM: ICD-10-CM

## 2021-06-28 ENCOUNTER — APPOINTMENT (OUTPATIENT)
Dept: NEUROLOGY | Facility: CLINIC | Age: 51
End: 2021-06-28
Payer: COMMERCIAL

## 2021-06-28 VITALS
DIASTOLIC BLOOD PRESSURE: 75 MMHG | TEMPERATURE: 97.8 F | WEIGHT: 170 LBS | HEART RATE: 65 BPM | HEIGHT: 68 IN | BODY MASS INDEX: 25.76 KG/M2 | SYSTOLIC BLOOD PRESSURE: 129 MMHG | OXYGEN SATURATION: 100 %

## 2021-06-28 DIAGNOSIS — M54.16 RADICULOPATHY, LUMBAR REGION: ICD-10-CM

## 2021-06-28 DIAGNOSIS — R29.898 OTHER SYMPTOMS AND SIGNS INVOLVING THE MUSCULOSKELETAL SYSTEM: ICD-10-CM

## 2021-06-28 PROCEDURE — 99213 OFFICE O/P EST LOW 20 MIN: CPT

## 2021-06-28 NOTE — HISTORY OF PRESENT ILLNESS
[FreeTextEntry1] : Ms. Bolden is a 49 yo woman who presents of follow up for her MS and cervical radiculoapthy.  \par She saw orthopedic doctor and was told she had tennis elbow on the right.  She also had MRI right hip which showed some abnormalities and no surgery was recommended and was sent for physical therapy.\par \par She is also noticing fatiguing of her muscles easily and SOB easily with little exertion\par \par \par \par \par \par

## 2021-06-28 NOTE — PHYSICAL EXAM
[FreeTextEntry1] : A+Ox3 language and attention normal\par NO facial tongue midline EOMI\par NO drift but tremor in right hand when outstretched\par Difficulty lifting RUE against gravity pain in the right elbow and upper forearm  on palpation\par GLEN symmetric\par FTN NL\par Decreased sensation of Left lateral tibial region \par Gait slightly antalgic vs hemiparetic\par Some slight neck flexion weakness and fatigable weakness in b/l proximal UE\par No fatigable ptosis\par \par

## 2021-06-28 NOTE — DISCUSSION/SUMMARY
[FreeTextEntry1] : Ms. Bolden is a 50 yo woman with MS who presents with new onset weakness and spasms of RUE after her COVID vaccination. There is some redness of the RUE and swelling possible due to injection of COVID vaccination into the RUE bursa?. The COVID vaccination also could have exacerbated her MS. \par \par PLAN:\par 1. Blood work including ACHr AB\par 2. Continue PT\par 3. Forms filled out for FMLA\par

## 2021-08-25 ENCOUNTER — APPOINTMENT (OUTPATIENT)
Dept: CARDIOLOGY | Facility: CLINIC | Age: 51
End: 2021-08-25
Payer: COMMERCIAL

## 2021-08-25 VITALS
DIASTOLIC BLOOD PRESSURE: 70 MMHG | BODY MASS INDEX: 25.16 KG/M2 | WEIGHT: 166 LBS | HEIGHT: 68 IN | SYSTOLIC BLOOD PRESSURE: 122 MMHG | HEART RATE: 58 BPM | TEMPERATURE: 97.3 F

## 2021-08-25 DIAGNOSIS — Z87.898 PERSONAL HISTORY OF OTHER SPECIFIED CONDITIONS: ICD-10-CM

## 2021-08-25 DIAGNOSIS — Z87.442 PERSONAL HISTORY OF URINARY CALCULI: ICD-10-CM

## 2021-08-25 PROCEDURE — 99214 OFFICE O/P EST MOD 30 MIN: CPT

## 2021-08-25 PROCEDURE — 93000 ELECTROCARDIOGRAM COMPLETE: CPT

## 2021-08-25 NOTE — REASON FOR VISIT
[Symptom and Test Evaluation] : symptom and test evaluation [Hyperlipidemia] : hyperlipidemia [Follow-Up - Clinic] : a clinic follow-up of [Chest Pain] : chest pain [Hypertension] : hypertension [Palpitations] : palpitations [FreeTextEntry3] : Dr. Soria

## 2021-08-25 NOTE — PHYSICAL EXAM
[General Appearance - Well Developed] : well developed [Normal Appearance] : normal appearance [Well Groomed] : well groomed [General Appearance - Well Nourished] : well nourished [No Deformities] : no deformities [General Appearance - In No Acute Distress] : no acute distress [Normal Conjunctiva] : the conjunctiva exhibited no abnormalities [Eyelids - No Xanthelasma] : the eyelids demonstrated no xanthelasmas [Normal Oral Mucosa] : normal oral mucosa [No Oral Pallor] : no oral pallor [No Oral Cyanosis] : no oral cyanosis [Exaggerated Use Of Accessory Muscles For Inspiration] : no accessory muscle use [Respiration, Rhythm And Depth] : normal respiratory rhythm and effort [Auscultation Breath Sounds / Voice Sounds] : lungs were clear to auscultation bilaterally [Abdomen Soft] : soft [Abdomen Tenderness] : non-tender [Abdomen Mass (___ Cm)] : no abdominal mass palpated [Abnormal Walk] : normal gait [Nail Clubbing] : no clubbing of the fingernails [Cyanosis, Localized] : no localized cyanosis [Petechial Hemorrhages (___cm)] : no petechial hemorrhages [Skin Color & Pigmentation] : normal skin color and pigmentation [] : no rash [No Venous Stasis] : no venous stasis [Skin Lesions] : no skin lesions [No Skin Ulcers] : no skin ulcer [No Xanthoma] : no  xanthoma was observed [Oriented To Time, Place, And Person] : oriented to person, place, and time [Affect] : the affect was normal [Mood] : the mood was normal [No Anxiety] : not feeling anxious [Normal Rate] : normal [Rhythm Regular] : regular [No Murmur] : no murmurs heard [1+] : left 1+ [No Pitting Edema] : no pitting edema present [FreeTextEntry1] : No JVD
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
## 2021-09-08 ENCOUNTER — APPOINTMENT (OUTPATIENT)
Dept: OBGYN | Facility: CLINIC | Age: 51
End: 2021-09-08
Payer: COMMERCIAL

## 2021-09-08 VITALS
SYSTOLIC BLOOD PRESSURE: 118 MMHG | DIASTOLIC BLOOD PRESSURE: 72 MMHG | WEIGHT: 163 LBS | BODY MASS INDEX: 24.71 KG/M2 | HEIGHT: 68 IN

## 2021-09-08 DIAGNOSIS — R10.2 PELVIC AND PERINEAL PAIN: ICD-10-CM

## 2021-09-08 DIAGNOSIS — R39.9 UNSPECIFIED SYMPTOMS AND SIGNS INVOLVING THE GENITOURINARY SYSTEM: ICD-10-CM

## 2021-09-08 PROCEDURE — 99214 OFFICE O/P EST MOD 30 MIN: CPT

## 2021-09-08 PROCEDURE — 81002 URINALYSIS NONAUTO W/O SCOPE: CPT

## 2021-09-08 NOTE — DISCUSSION/SUMMARY
[FreeTextEntry1] : Advised consultation with urologist/urogynecologist regarding recurrent urinary tract infections and history of multiple sclerosis.\par Issues regarding recurrent urinary tract infections and possible preventive measures discussed with patient.\par Follow-up 6 months or as needed\par

## 2021-09-08 NOTE — HISTORY OF PRESENT ILLNESS
[FreeTextEntry1] : Patient is 50 years old para 1-0-0-1 last menstrual period January 1, 2018\par Patient states that she had 3 urinary tract infections in the past 3 months\par She has a history of multiple sclerosis.  Most recently patient was on Macrobid without satisfactory results and was subsequently treated with Cipro which she is presently on.  Urine dip is noted to be within normal limits, negative for leukocytes and noted to have microscopic hematuria.

## 2021-10-08 LAB
APPEARANCE: NORMAL
BILIRUBIN URINE: NORMAL
BLOOD URINE: NORMAL
COLOR: YELLOW
GLUCOSE QUALITATIVE U: NORMAL
GLUCOSE UR-MCNC: NORMAL
HGB UR QL STRIP.AUTO: NORMAL
KETONES URINE: NORMAL
LEUKOCYTE ESTERASE UR QL STRIP: NORMAL
LEUKOCYTE ESTERASE URINE: NORMAL
NITRITE URINE: NORMAL
PH URINE: 5.5
PROT UR STRIP-MCNC: NORMAL
PROTEIN URINE: NORMAL
SPECIFIC GRAVITY URINE: 1.03
URINALYSIS, DIPSTICK: NORMAL
UROBILINOGEN URINE: NORMAL

## 2021-11-22 ENCOUNTER — APPOINTMENT (OUTPATIENT)
Dept: CARDIOLOGY | Facility: CLINIC | Age: 51
End: 2021-11-22
Payer: COMMERCIAL

## 2021-11-22 PROCEDURE — 93272 ECG/REVIEW INTERPRET ONLY: CPT

## 2021-12-10 ENCOUNTER — APPOINTMENT (OUTPATIENT)
Dept: CARDIOLOGY | Facility: CLINIC | Age: 51
End: 2021-12-10
Payer: COMMERCIAL

## 2021-12-10 VITALS — HEART RATE: 61 BPM

## 2021-12-10 VITALS — TEMPERATURE: 97.6 F | BODY MASS INDEX: 44.41 KG/M2 | WEIGHT: 293 LBS | HEIGHT: 68 IN

## 2021-12-10 VITALS — SYSTOLIC BLOOD PRESSURE: 130 MMHG | DIASTOLIC BLOOD PRESSURE: 80 MMHG

## 2021-12-10 PROCEDURE — 93000 ELECTROCARDIOGRAM COMPLETE: CPT

## 2021-12-10 PROCEDURE — 99214 OFFICE O/P EST MOD 30 MIN: CPT
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
## 2021-12-10 NOTE — CARDIOLOGY SUMMARY
[___] : [unfilled] [de-identified] : 8- NSR NS ST-T changes \par 12- NSR NS ST-T changes  [de-identified] : 11- PAC's PVC's both 1%  one short episode of AT SVT at only 112/minute none fast  [de-identified] : 8- ETT Echo completed 7 minutes and 42 seconds Trace MR mld TR No ischemia .

## 2021-12-10 NOTE — HISTORY OF PRESENT ILLNESS
[FreeTextEntry1] : The patient has had palpitations , this seemed to have correspond to increase stress . MCOT monitor showed PVC's and one run of ??SVT ? AT but rate was only 112 . I have discussed medication with her . The patient has a HR in the low 60's and has had vasovagal syncope in the past . Would not be a goo candidate for beta blockers.

## 2021-12-10 NOTE — PHYSICAL EXAM
[General Appearance - Well Developed] : well developed [Normal Appearance] : normal appearance [Well Groomed] : well groomed [General Appearance - Well Nourished] : well nourished [No Deformities] : no deformities [General Appearance - In No Acute Distress] : no acute distress [Normal Conjunctiva] : the conjunctiva exhibited no abnormalities [Eyelids - No Xanthelasma] : the eyelids demonstrated no xanthelasmas [Normal Oral Mucosa] : normal oral mucosa [No Oral Pallor] : no oral pallor [No Oral Cyanosis] : no oral cyanosis [Respiration, Rhythm And Depth] : normal respiratory rhythm and effort [Exaggerated Use Of Accessory Muscles For Inspiration] : no accessory muscle use [Auscultation Breath Sounds / Voice Sounds] : lungs were clear to auscultation bilaterally [Abdomen Soft] : soft [Abdomen Tenderness] : non-tender [Abdomen Mass (___ Cm)] : no abdominal mass palpated [Abnormal Walk] : normal gait [Nail Clubbing] : no clubbing of the fingernails [Cyanosis, Localized] : no localized cyanosis [Petechial Hemorrhages (___cm)] : no petechial hemorrhages [Skin Color & Pigmentation] : normal skin color and pigmentation [] : no rash [No Venous Stasis] : no venous stasis [Skin Lesions] : no skin lesions [No Skin Ulcers] : no skin ulcer [No Xanthoma] : no  xanthoma was observed [Oriented To Time, Place, And Person] : oriented to person, place, and time [Affect] : the affect was normal [Mood] : the mood was normal [No Anxiety] : not feeling anxious [Normal Rate] : normal [Rhythm Regular] : regular [No Murmur] : no murmurs heard [1+] : left 1+ [No Pitting Edema] : no pitting edema present [FreeTextEntry1] : No JVD

## 2021-12-10 NOTE — OBJECTIVE
[History reviewed] : History reviewed. see downtime note [Medications and Allergies reviewed] : Medications and allergies reviewed.

## 2022-01-07 ENCOUNTER — APPOINTMENT (OUTPATIENT)
Dept: NEUROLOGY | Facility: CLINIC | Age: 52
End: 2022-01-07
Payer: COMMERCIAL

## 2022-01-07 DIAGNOSIS — G31.84 MILD COGNITIVE IMPAIRMENT, SO STATED: ICD-10-CM

## 2022-01-07 PROCEDURE — 99213 OFFICE O/P EST LOW 20 MIN: CPT | Mod: 95

## 2022-01-07 NOTE — DISCUSSION/SUMMARY
[FreeTextEntry1] : Ms. Bolden is a 50 yo woman with MS who presents with new onset weakness and spasms of RUE after her COVID vaccination. There is some redness of the RUE and swelling possible due to injection of COVID vaccination into the RUE bursa?. The COVID vaccination also could have exacerbated her MS. \par \par PLAN:\par 1. Blood work including iron studies and lipid profle\par 2. Continue PT\par 3. EPS evaluation for palpitations\par 4. F/u with heme/onc for ?thymus lesion\par

## 2022-01-07 NOTE — PHYSICAL EXAM
[FreeTextEntry1] : a+Ox3 language and attention normal\par NO facial\par EOMI\par Movement symmetric

## 2022-01-07 NOTE — HISTORY OF PRESENT ILLNESS
[FreeTextEntry1] : Ms. Bolden is a 52 yo woman who presents of follow up for her MS and cervical radiculoapthy.  \par She continues noticing fatiguing of her muscles easily and SOB easily with little exertion.  She is getting daily frequent palpitations and has an appt with EPS next month\par She has noticed some tremor in her right hand more recently\par Has been exposed to COVID patient frequently in last 2 weeks\par Did not get booster dose because had reaction to 2nd vaccine dose\par \par \par \par \par \par

## 2022-01-27 ENCOUNTER — RESULT REVIEW (OUTPATIENT)
Age: 52
End: 2022-01-27

## 2022-02-16 ENCOUNTER — APPOINTMENT (OUTPATIENT)
Dept: CARDIOLOGY | Facility: CLINIC | Age: 52
End: 2022-02-16
Payer: COMMERCIAL

## 2022-02-16 VITALS
DIASTOLIC BLOOD PRESSURE: 84 MMHG | HEART RATE: 68 BPM | TEMPERATURE: 97.1 F | WEIGHT: 166.13 LBS | BODY MASS INDEX: 25.18 KG/M2 | HEIGHT: 68 IN | SYSTOLIC BLOOD PRESSURE: 125 MMHG

## 2022-02-16 PROCEDURE — 93000 ELECTROCARDIOGRAM COMPLETE: CPT

## 2022-02-16 PROCEDURE — 99204 OFFICE O/P NEW MOD 45 MIN: CPT

## 2022-02-16 NOTE — DISCUSSION/SUMMARY
[FreeTextEntry1] : I have educated the patient that BB may also lower their blood pressure and that they should monitor their BP at home and to call us and hold the medication if SBP is less than 90 mmHg. I have also informed the patient that this medication can cause fatigue, dizziness or lightheadedness and to watch for these symptoms.\par

## 2022-02-16 NOTE — ASSESSMENT
[FreeTextEntry1] : # Palpitations\par - Patient with brief nonsustained SVT lasting 14 sec and 1% PVC burden associated with symptoms. \par - Discussed option of starting Metoprolol XL or Verapamil but pt prefers a pill in pocket approach. Prescribed Metoprolol Tartrate 25mg PO as needed\par - Pt admits to sig stress and does not drink enough water or sleep. Advised pt on importance of stress management and sleep as this will further contribute to symptoms. \par - Cardiac tests including echo, stress, and CTA reviewed with patient. \par - Labs from July with normal TFTs, CBC, electrolytes.\par - Advised pt to monitor caffeine and alcohol consumption as this will also exacerbate symptoms\par - Offered cardiac MRI, but pt would like to hold off for now, which is reasonable given low PVC burden\par \par I have also advised the patient to go to the nearest emergency room if she experiences any chest pain, dyspnea, syncope, or has any other compelling symptoms.\par \par Follow up in 6 mo

## 2022-02-16 NOTE — CARDIOLOGY SUMMARY
[de-identified] : 2/16/22 NSR (HR 68 bpm), PVC [de-identified] : 11/2/21-11/22/21 25 sx recorded that correlate with isolated PVCs. PVC burden 1 %. One episode of SVT lasting 14 sec.  [de-identified] : 8/26/2020 Stress ECHO Rob, 7:52, 89% MPHR, 10.4 METS\par EF 55-65%. Equivocal/borderline ST wave changes in response to stress. No clinical or 2D echo evidence of exercise-induced ischemia at 89% MPHR.  [de-identified] :  CTA Cardiac 3/22/2017 Coronary Ca score 0. Normal coronary arteries. Small hiatal hernia.

## 2022-02-16 NOTE — PHYSICAL EXAM
[Well Developed] : well developed [No Acute Distress] : no acute distress [Well Nourished] : well nourished [Normal Conjunctiva] : normal conjunctiva [Normal Venous Pressure] : normal venous pressure [Normal S1, S2] : normal S1, S2 [No Murmur] : no murmur [Clear Lung Fields] : clear lung fields [Good Air Entry] : good air entry [No Respiratory Distress] : no respiratory distress  [Soft] : abdomen soft [Normal Gait] : normal gait [No Edema] : no edema [No Rash] : no rash [Moves all extremities] : moves all extremities [Normal Speech] : normal speech [Alert and Oriented] : alert and oriented

## 2022-02-16 NOTE — HISTORY OF PRESENT ILLNESS
[FreeTextEntry1] : \par Referring: Dr. Avitia\par \par 50 yo F  who presents for initial evaluation of palpitations. She had a monitor with her cardiologist during which she recorded 25 symptoms, all that correlated to monomorphic isolated PVCs. PVC burden 1%. She also had one episode of 14 sec of PSVT.  She notices worse palpitations with alcohol. She denies chest pain, dizziness, lightheadedness, presyncope or syncope.

## 2022-03-08 ENCOUNTER — APPOINTMENT (OUTPATIENT)
Dept: CARDIOLOGY | Facility: CLINIC | Age: 52
End: 2022-03-08

## 2022-03-11 ENCOUNTER — APPOINTMENT (OUTPATIENT)
Dept: CARDIOLOGY | Facility: CLINIC | Age: 52
End: 2022-03-11

## 2022-06-29 ENCOUNTER — APPOINTMENT (OUTPATIENT)
Dept: NEUROLOGY | Facility: CLINIC | Age: 52
End: 2022-06-29

## 2022-06-29 VITALS
SYSTOLIC BLOOD PRESSURE: 117 MMHG | OXYGEN SATURATION: 98 % | DIASTOLIC BLOOD PRESSURE: 78 MMHG | BODY MASS INDEX: 25.76 KG/M2 | HEART RATE: 73 BPM | WEIGHT: 170 LBS | HEIGHT: 68 IN

## 2022-06-29 DIAGNOSIS — M25.559 PAIN IN UNSPECIFIED HIP: ICD-10-CM

## 2022-06-29 PROCEDURE — 99214 OFFICE O/P EST MOD 30 MIN: CPT

## 2022-06-29 NOTE — PHYSICAL EXAM
[FreeTextEntry1] : MS: Awake, alert, oriented to person, place, situation and time.  Follows commands. \par \par Language: Speech is clear, fluent. No dysarthria. \par \par CNs 2 - 12 intact. EOMI no nystagmus, no diplopia. No facial asymmetry b/l. Tongue midline, normal movements, no atrophy.\par \par Motor: Normal muscle bulk & tone. No noticeable tremor or seizure. No pronator drift. Muscle strength of b/l UE and b/l LE 5/5. Tremors in R>L hand \par \par Sensation: Decreased temperature in left L4 dermatome\par \par Cortical: No extinction\par \par Coordination: Intact rapid-alternating movements. No dysmetria to FTN. \par \par DTR: 2+ in biceps, brachioradialis and triceps; 1+ in patellar and ankle; plantars are down b/l\par \par Gait: No postural instability. Normal stance and tandem gait.\par \par General: Alert and oriented to person, place, time, and situation. In no acute distress. Cooperative. Follows commands. \par Eyes: Sclera and conjunctiva were normal and pupils were equal in size, round, reactive to light, with normal accommodation\par ENT: Ears and nose normal in appearance. \par Vascular: No peripheral edema.\par Respiratory: No visible respiratory distress. \par Musculoskeletal: No involuntary movements were seen.\par Skin: Normal skin color and pigmentation.\par

## 2022-06-29 NOTE — DISCUSSION/SUMMARY
[FreeTextEntry1] : Ms. Bolden is a 52 yo woman with MS with worsening fatigue, dizziness and tightness travelling around her back to front ([facility] [place]).  She also has right groin pain which is likely related to her right hip\par \par PLAN:\par 1. MRI brain, cervical and thoracic spine\par 2. Orthopedic referral\par 3. If any new lesions would start ocrevus

## 2022-06-29 NOTE — HISTORY OF PRESENT ILLNESS
[FreeTextEntry1] : Ms. Bolden is a 52 yo woman who presents of follow up for her MS and cervical radiculoapthy.  \par She continues noticing fatiguing of her muscles easily and SOB easily with little exertion.  She is getting frequent palpitations and after evaluation with EPS was found to have SVT and 1% PVC burden.  She opted for metoprolol as needed.\par She has become more fatigued recently mostly due too working more at work, and taking care of her family.\par \par \par \par

## 2022-07-28 ENCOUNTER — APPOINTMENT (OUTPATIENT)
Dept: OBGYN | Facility: CLINIC | Age: 52
End: 2022-07-28

## 2022-09-21 ENCOUNTER — APPOINTMENT (OUTPATIENT)
Dept: OBGYN | Facility: CLINIC | Age: 52
End: 2022-09-21

## 2022-09-21 VITALS
DIASTOLIC BLOOD PRESSURE: 70 MMHG | WEIGHT: 164 LBS | SYSTOLIC BLOOD PRESSURE: 116 MMHG | HEIGHT: 68 IN | BODY MASS INDEX: 24.86 KG/M2

## 2022-09-21 DIAGNOSIS — Z01.419 ENCOUNTER FOR GYNECOLOGICAL EXAMINATION (GENERAL) (ROUTINE) W/OUT ABNORMAL FINDINGS: ICD-10-CM

## 2022-09-21 LAB
BILIRUB UR QL STRIP: NORMAL
GLUCOSE UR-MCNC: NORMAL
HCG UR QL: NORMAL EU/DL
HGB UR QL STRIP.AUTO: NORMAL
KETONES UR-MCNC: NORMAL
LEUKOCYTE ESTERASE UR QL STRIP: NORMAL
NITRITE UR QL STRIP: NORMAL
PH UR STRIP: NORMAL
PROT UR STRIP-MCNC: NORMAL
SP GR UR STRIP: NORMAL

## 2022-09-21 PROCEDURE — 81002 URINALYSIS NONAUTO W/O SCOPE: CPT

## 2022-09-21 PROCEDURE — 99396 PREV VISIT EST AGE 40-64: CPT

## 2022-09-21 NOTE — DISCUSSION/SUMMARY
[FreeTextEntry1] : Pap done\par Self breast exam stressed\par Prescribed bilateral screening mammogram and bilateral breast ultrasound\par Prescribed pelvic ultrasound\par Recommend OTC Replens vaginal moisturizer two times a week as an alterative to hormonal treatment.\par \par Follow-up yearly or as needed

## 2022-09-21 NOTE — HISTORY OF PRESENT ILLNESS
[FreeTextEntry1] : Patient is 51 years old para 1-0-0-1 last menstrual period January 1, 2018.\par Patient denies postmenopausal bleeding.\par Patient notes discomfort/irritation with sexual relations.\par Pelvic ultrasound performed on September 14, 2021 noted questionable endometrial polyp.  Patient did not follow-up with sonohysterogram and requests repeat pelvic ultrasound at this time.\par

## 2022-09-29 ENCOUNTER — APPOINTMENT (OUTPATIENT)
Dept: SURGERY | Facility: CLINIC | Age: 52
End: 2022-09-29

## 2022-10-20 ENCOUNTER — APPOINTMENT (OUTPATIENT)
Dept: CARDIOLOGY | Facility: CLINIC | Age: 52
End: 2022-10-20

## 2022-10-27 ENCOUNTER — APPOINTMENT (OUTPATIENT)
Dept: CARDIOLOGY | Facility: CLINIC | Age: 52
End: 2022-10-27

## 2022-10-27 VITALS
HEART RATE: 69 BPM | TEMPERATURE: 97.3 F | WEIGHT: 163 LBS | BODY MASS INDEX: 24.71 KG/M2 | DIASTOLIC BLOOD PRESSURE: 76 MMHG | SYSTOLIC BLOOD PRESSURE: 121 MMHG | HEIGHT: 68 IN

## 2022-10-27 PROCEDURE — 99214 OFFICE O/P EST MOD 30 MIN: CPT | Mod: 25

## 2022-10-27 PROCEDURE — 93000 ELECTROCARDIOGRAM COMPLETE: CPT

## 2022-10-27 RX ORDER — HYDROCORTISONE 25 MG/G
2.5 CREAM TOPICAL
Qty: 30 | Refills: 0 | Status: COMPLETED | COMMUNITY
Start: 2022-03-29 | End: 2022-10-27

## 2022-10-27 RX ORDER — UBIDECARENONE/VIT E ACET 100MG-5
CAPSULE ORAL DAILY
Refills: 0 | Status: COMPLETED | COMMUNITY
End: 2022-10-27

## 2022-10-27 RX ORDER — NITROFURANTOIN (MONOHYDRATE/MACROCRYSTALS) 25; 75 MG/1; MG/1
100 CAPSULE ORAL
Qty: 14 | Refills: 0 | Status: COMPLETED | COMMUNITY
Start: 2022-02-03 | End: 2022-10-27

## 2022-10-30 NOTE — HISTORY OF PRESENT ILLNESS
[FreeTextEntry1] : \par Referring: Dr. Avitia\par \par 52 yo F  who presents for initial evaluation of palpitations. She had a monitor with her cardiologist during which she recorded 25 symptoms, all that correlated to monomorphic isolated PVCs. PVC burden 1%. She also had one episode of 14 sec of PSVT.  She notices worse palpitations with alcohol. She denies chest pain, dizziness, lightheadedness, presyncope or syncope. \par \par 10/27: More episodes of Palpitations- states that they are associated with mental stress related to family/work she is being going on.\par \par EKG (10/27): SR 69, , QRSd 94, QTc 393\par

## 2022-10-30 NOTE — ASSESSMENT
[FreeTextEntry1] : # Palpitations\par # SVT\par # PVC\par \par - Palpitations likely from PVCs. Will do Holter to confirm and assess burden\par - Patient to start metoprolol after Holter is done. If no benefit, she will call us back to adjust dosing/meds\par - Return in 6 months

## 2022-12-15 ENCOUNTER — APPOINTMENT (OUTPATIENT)
Dept: CARDIOLOGY | Facility: CLINIC | Age: 52
End: 2022-12-15
Payer: COMMERCIAL

## 2022-12-15 PROCEDURE — 99422 OL DIG E/M SVC 11-20 MIN: CPT

## 2022-12-15 PROCEDURE — 99442: CPT | Mod: 95

## 2022-12-29 NOTE — PHYSICAL EXAM
[Well Developed] : well developed [Well Nourished] : well nourished [No Acute Distress] : no acute distress [No Respiratory Distress] : no respiratory distress  [Alert and Oriented] : alert and oriented

## 2022-12-29 NOTE — ASSESSMENT
[FreeTextEntry1] : # Palpitations\par # SVT\par # PVC\par \par - MCOT results reviewed. \par - symptoms are c.w PVC.\par - Discussed management options including clinical observation (as shes feeling better now), Meds.\par - After detailed discussion, she will do PRN meds. Discussed on how to manage meds.\par - Return in 6 months \par \par I spent total 17  minutes in preparing for the visit (such as reviewing tests); getting or reviewing a history that was separately obtained; performing the exam; counseling and providing education to the patient, family, or caregiver; ordering medicines, tests, or procedures; communicating with other healthcare professionals; documenting information in the medical record; interpreting results and sharing that information with the patient, family, or caregiver; and care coordination.

## 2022-12-29 NOTE — CARDIOLOGY SUMMARY
[de-identified] : 2/16/22 NSR (HR 68 bpm), PVC [de-identified] : 11/2/21-11/22/21 25 sx recorded that correlate with isolated PVCs. PVC burden 1 %. One episode of SVT lasting 14 sec.  [de-identified] : 8/26/2020 Stress ECHO Rob, 7:52, 89% MPHR, 10.4 METS\par EF 55-65%. Equivocal/borderline ST wave changes in response to stress. No clinical or 2D echo evidence of exercise-induced ischemia at 89% MPHR.  [de-identified] :  CTA Cardiac 3/22/2017 Coronary Ca score 0. Normal coronary arteries. Small hiatal hernia.

## 2022-12-29 NOTE — HISTORY OF PRESENT ILLNESS
[FreeTextEntry1] : \par Referring: Dr. Avitia\par \par 52 yo F  who presents for initial evaluation of palpitations. She had a monitor with her cardiologist during which she recorded 25 symptoms, all that correlated to monomorphic isolated PVCs. PVC burden 1%. She also had one episode of 14 sec of PSVT.  She notices worse palpitations with alcohol. She denies chest pain, dizziness, lightheadedness, presyncope or syncope. \par \par 10/27: More episodes of Palpitations- states that they are associated with mental stress related to family/work she is being going on.\par \par 12/15/22: \par Ms. Bolden has given me verbal authorization to provide the tele services\par Verbal consent given on 012/15/2022  by the patient.\par \par This visit was provided via telehealth using real-time 2-way audio visual technology. The patient,  Ms. Bolden, was located at home at the time of the visit. \par \par Feels fine. \par \par EKG (10/27): SR 69, , QRSd 94, QTc 393\par

## 2023-01-18 ENCOUNTER — APPOINTMENT (OUTPATIENT)
Dept: NEUROLOGY | Facility: CLINIC | Age: 53
End: 2023-01-18
Payer: COMMERCIAL

## 2023-01-18 VITALS
TEMPERATURE: 98 F | OXYGEN SATURATION: 98 % | BODY MASS INDEX: 25.61 KG/M2 | WEIGHT: 169 LBS | HEART RATE: 85 BPM | HEIGHT: 68 IN | SYSTOLIC BLOOD PRESSURE: 130 MMHG | DIASTOLIC BLOOD PRESSURE: 85 MMHG

## 2023-01-18 PROCEDURE — 99214 OFFICE O/P EST MOD 30 MIN: CPT

## 2023-01-18 NOTE — HISTORY OF PRESENT ILLNESS
[FreeTextEntry1] : Ms. Bolden is a 53 yo woman who presents of follow up for her MS and cervical radiculopathy.  \par She continues noticing fatiguing of her muscles easily and SOB easily with little exertion.  She is getting frequent palpitations and after evaluation with EPS was found to have SVT and 1% PVC burden.  She opted for metoprolol as needed.\par She has become more fatigued recently mostly due too working more at work, and taking care of her family.\par \par Since her last visit she had COVD infection and 2 other viral syndromes from Thanksgiving onwards.  Since this began she has been feeling worse.  She is also very depressed and having severe anxiety.  She was given xanax which she tried which helped with her tremors and shakiness but made her feel confused (0.5mg dose)\par \par \par \par \par

## 2023-01-18 NOTE — PHYSICAL EXAM
[FreeTextEntry1] : MS: Awake, alert, oriented to person, place, situation and time.  Follows commands. \par \par Language: Speech is clear, fluent. No dysarthria. \par \par CNs 2 - 12 intact. EOMI no nystagmus, no diplopia. No facial asymmetry b/l. Tongue midline, normal movements, no atrophy.\par \par Motor: Normal muscle bulk & tone. No noticeable tremor or seizure. No pronator drift. Muscle strength of b/l UE and b/l LE 5/5. Tremors in R>L hand and sometimes in the head\par \par Sensation: Decreased temperature in left L4 dermatome\par \par Cortical: No extinction\par \par Coordination: Intact rapid-alternating movements. No dysmetria to FTN. \par \par DTR: 2+ in biceps, brachioradialis and triceps; 1+ in patellar and ankle; plantars are down b/l\par \par Gait: No postural instability. Normal stance and tandem gait.\par \par General: Alert and oriented to person, place, time, and situation. In no acute distress. Cooperative. Follows commands. \par Eyes: Sclera and conjunctiva were normal and pupils were equal in size, round, reactive to light, with normal accommodation\par ENT: Ears and nose normal in appearance. \par Vascular: No peripheral edema.\par Respiratory: No visible respiratory distress. \par Musculoskeletal: No involuntary movements were seen.\par Skin: Normal skin color and pigmentation.\par

## 2023-01-18 NOTE — DISCUSSION/SUMMARY
[FreeTextEntry1] : Ms. Bolden is a 53 yo woman with MS with worsening fatigue, dizziness and tightness travelling around her back to front ([facility]).  She also has right groin pain which is likely related to her right hip\par \par PLAN:\par 1. MRI brain, thoracic spine\par 2. psychiatry evaluation\par 3. f/u on b/w with heme/onc

## 2023-01-25 ENCOUNTER — APPOINTMENT (OUTPATIENT)
Dept: CARDIOLOGY | Facility: CLINIC | Age: 53
End: 2023-01-25
Payer: COMMERCIAL

## 2023-01-25 VITALS — DIASTOLIC BLOOD PRESSURE: 70 MMHG | HEART RATE: 76 BPM | SYSTOLIC BLOOD PRESSURE: 102 MMHG

## 2023-01-25 VITALS — HEIGHT: 68 IN | BODY MASS INDEX: 23.95 KG/M2 | TEMPERATURE: 98 F | WEIGHT: 158 LBS

## 2023-01-25 DIAGNOSIS — I10 ESSENTIAL (PRIMARY) HYPERTENSION: ICD-10-CM

## 2023-01-25 DIAGNOSIS — F41.9 ANXIETY DISORDER, UNSPECIFIED: ICD-10-CM

## 2023-01-25 DIAGNOSIS — R13.10 DYSPHAGIA, UNSPECIFIED: ICD-10-CM

## 2023-01-25 DIAGNOSIS — R53.83 OTHER FATIGUE: ICD-10-CM

## 2023-01-25 DIAGNOSIS — E78.5 HYPERLIPIDEMIA, UNSPECIFIED: ICD-10-CM

## 2023-01-25 DIAGNOSIS — F32.A ANXIETY DISORDER, UNSPECIFIED: ICD-10-CM

## 2023-01-25 PROCEDURE — 99214 OFFICE O/P EST MOD 30 MIN: CPT | Mod: 25

## 2023-01-25 PROCEDURE — 93000 ELECTROCARDIOGRAM COMPLETE: CPT

## 2023-01-25 RX ORDER — METOPROLOL TARTRATE 25 MG/1
25 TABLET, FILM COATED ORAL TWICE DAILY
Qty: 30 | Refills: 3 | Status: DISCONTINUED | COMMUNITY
Start: 2022-02-16 | End: 2023-01-25

## 2023-01-25 NOTE — HISTORY OF PRESENT ILLNESS
[FreeTextEntry1] : The patient had Covid in the end of November . The patient had a cough and severe fatigue and high fever  . She was treated with Paxlovid but was not able to tolerate it . She had nausea and vomiting since the . The patient had chest pain which radiates to right side . When she sneezes she has a strange sensation in her chst . The pateint did not have CAD on CTA  in the past . She has had a  heightened sense of anxiety since this had started . She has lost weight over the past 2 weeks

## 2023-01-25 NOTE — ASSESSMENT
[FreeTextEntry1] : The patient has had Covid in November . The patient has had nausea and vomiting  and has had influenza as well. The patient has had lower sternal and right sided chest and back pain . On exam it is exquisitely tender to palpation. There is no vesicular rash . Does not appear to be Zoster like and she was told that she has a fungal rash . 02 sat is normal . She is not tachycardic .

## 2023-01-25 NOTE — CARDIOLOGY SUMMARY
[___] : [unfilled] [de-identified] : 8- NSR NS ST-T changes \par 12- NSR NS ST-T changes \par 1- NSR NS T wave change.  [de-identified] : 11- PAC's PVC's both 1%  one short episode of AT SVT at only 112/minute none fast  [de-identified] : 8- ETT Echo completed 7 minutes and 42 seconds Trace MR mld TR No ischemia .

## 2023-01-29 LAB
CRP SERPL-MCNC: <3 MG/L
DEPRECATED D DIMER PPP IA-ACNC: <150 NG/ML DDU
ERYTHROCYTE [SEDIMENTATION RATE] IN BLOOD BY WESTERGREN METHOD: 7 MM/HR

## 2023-02-04 ENCOUNTER — APPOINTMENT (OUTPATIENT)
Dept: CARDIOLOGY | Facility: CLINIC | Age: 53
End: 2023-02-04
Payer: COMMERCIAL

## 2023-02-04 DIAGNOSIS — R00.2 PALPITATIONS: ICD-10-CM

## 2023-02-04 DIAGNOSIS — R07.89 OTHER CHEST PAIN: ICD-10-CM

## 2023-02-04 DIAGNOSIS — I34.0 NONRHEUMATIC MITRAL (VALVE) INSUFFICIENCY: ICD-10-CM

## 2023-02-04 DIAGNOSIS — R07.9 CHEST PAIN, UNSPECIFIED: ICD-10-CM

## 2023-02-04 DIAGNOSIS — R55 SYNCOPE AND COLLAPSE: ICD-10-CM

## 2023-02-04 DIAGNOSIS — I47.1 SUPRAVENTRICULAR TACHYCARDIA: ICD-10-CM

## 2023-02-04 DIAGNOSIS — I49.3 VENTRICULAR PREMATURE DEPOLARIZATION: ICD-10-CM

## 2023-02-04 PROCEDURE — 93306 TTE W/DOPPLER COMPLETE: CPT

## 2023-02-05 PROBLEM — I49.3 PVC (PREMATURE VENTRICULAR CONTRACTION): Status: ACTIVE | Noted: 2022-02-16

## 2023-02-05 PROBLEM — R07.89 CHEST HEAVINESS: Status: ACTIVE | Noted: 2022-02-16

## 2023-02-05 PROBLEM — I47.1 PAROXYSMAL SVT (SUPRAVENTRICULAR TACHYCARDIA): Status: ACTIVE | Noted: 2018-07-13

## 2023-02-05 PROBLEM — R07.9 CHEST PAIN: Status: ACTIVE | Noted: 2021-08-25

## 2023-02-15 ENCOUNTER — EMERGENCY (EMERGENCY)
Facility: HOSPITAL | Age: 53
LOS: 0 days | Discharge: LEFT BEFORE DISCHARGE | End: 2023-02-15
Attending: EMERGENCY MEDICINE
Payer: COMMERCIAL

## 2023-02-15 VITALS
SYSTOLIC BLOOD PRESSURE: 122 MMHG | RESPIRATION RATE: 14 BRPM | HEART RATE: 83 BPM | DIASTOLIC BLOOD PRESSURE: 58 MMHG | OXYGEN SATURATION: 98 % | TEMPERATURE: 98 F

## 2023-02-15 DIAGNOSIS — R06.02 SHORTNESS OF BREATH: ICD-10-CM

## 2023-02-15 DIAGNOSIS — R42 DIZZINESS AND GIDDINESS: ICD-10-CM

## 2023-02-15 DIAGNOSIS — Z91.040 LATEX ALLERGY STATUS: ICD-10-CM

## 2023-02-15 DIAGNOSIS — Z91.013 ALLERGY TO SEAFOOD: ICD-10-CM

## 2023-02-15 DIAGNOSIS — R00.2 PALPITATIONS: ICD-10-CM

## 2023-02-15 DIAGNOSIS — Z53.29 PROCEDURE AND TREATMENT NOT CARRIED OUT BECAUSE OF PATIENT'S DECISION FOR OTHER REASONS: ICD-10-CM

## 2023-02-15 DIAGNOSIS — G35 MULTIPLE SCLEROSIS: ICD-10-CM

## 2023-02-15 DIAGNOSIS — Z87.42 PERSONAL HISTORY OF OTHER DISEASES OF THE FEMALE GENITAL TRACT: ICD-10-CM

## 2023-02-15 DIAGNOSIS — Z86.2 PERSONAL HISTORY OF DISEASES OF THE BLOOD AND BLOOD-FORMING ORGANS AND CERTAIN DISORDERS INVOLVING THE IMMUNE MECHANISM: ICD-10-CM

## 2023-02-15 LAB
ALBUMIN SERPL ELPH-MCNC: 5 G/DL — SIGNIFICANT CHANGE UP (ref 3.5–5.2)
ALP SERPL-CCNC: 107 U/L — SIGNIFICANT CHANGE UP (ref 30–115)
ALT FLD-CCNC: 10 U/L — SIGNIFICANT CHANGE UP (ref 0–41)
ANION GAP SERPL CALC-SCNC: 9 MMOL/L — SIGNIFICANT CHANGE UP (ref 7–14)
AST SERPL-CCNC: 11 U/L — SIGNIFICANT CHANGE UP (ref 0–41)
BASOPHILS # BLD AUTO: 0.03 K/UL — SIGNIFICANT CHANGE UP (ref 0–0.2)
BASOPHILS NFR BLD AUTO: 0.4 % — SIGNIFICANT CHANGE UP (ref 0–1)
BILIRUB SERPL-MCNC: 0.6 MG/DL — SIGNIFICANT CHANGE UP (ref 0.2–1.2)
BUN SERPL-MCNC: 12 MG/DL — SIGNIFICANT CHANGE UP (ref 10–20)
CALCIUM SERPL-MCNC: 9.7 MG/DL — SIGNIFICANT CHANGE UP (ref 8.4–10.4)
CHLORIDE SERPL-SCNC: 105 MMOL/L — SIGNIFICANT CHANGE UP (ref 98–110)
CO2 SERPL-SCNC: 28 MMOL/L — SIGNIFICANT CHANGE UP (ref 17–32)
CREAT SERPL-MCNC: 0.7 MG/DL — SIGNIFICANT CHANGE UP (ref 0.7–1.5)
EGFR: 105 ML/MIN/1.73M2 — SIGNIFICANT CHANGE UP
EOSINOPHIL # BLD AUTO: 0.04 K/UL — SIGNIFICANT CHANGE UP (ref 0–0.7)
EOSINOPHIL NFR BLD AUTO: 0.6 % — SIGNIFICANT CHANGE UP (ref 0–8)
GLUCOSE SERPL-MCNC: 104 MG/DL — HIGH (ref 70–99)
HCT VFR BLD CALC: 42.2 % — SIGNIFICANT CHANGE UP (ref 37–47)
HGB BLD-MCNC: 14.4 G/DL — SIGNIFICANT CHANGE UP (ref 12–16)
IMM GRANULOCYTES NFR BLD AUTO: 0.1 % — SIGNIFICANT CHANGE UP (ref 0.1–0.3)
LYMPHOCYTES # BLD AUTO: 2.11 K/UL — SIGNIFICANT CHANGE UP (ref 1.2–3.4)
LYMPHOCYTES # BLD AUTO: 29.4 % — SIGNIFICANT CHANGE UP (ref 20.5–51.1)
MAGNESIUM SERPL-MCNC: 2 MG/DL — SIGNIFICANT CHANGE UP (ref 1.8–2.4)
MCHC RBC-ENTMCNC: 29.5 PG — SIGNIFICANT CHANGE UP (ref 27–31)
MCHC RBC-ENTMCNC: 34.1 G/DL — SIGNIFICANT CHANGE UP (ref 32–37)
MCV RBC AUTO: 86.5 FL — SIGNIFICANT CHANGE UP (ref 81–99)
MONOCYTES # BLD AUTO: 0.45 K/UL — SIGNIFICANT CHANGE UP (ref 0.1–0.6)
MONOCYTES NFR BLD AUTO: 6.3 % — SIGNIFICANT CHANGE UP (ref 1.7–9.3)
NEUTROPHILS # BLD AUTO: 4.53 K/UL — SIGNIFICANT CHANGE UP (ref 1.4–6.5)
NEUTROPHILS NFR BLD AUTO: 63.2 % — SIGNIFICANT CHANGE UP (ref 42.2–75.2)
NRBC # BLD: 0 /100 WBCS — SIGNIFICANT CHANGE UP (ref 0–0)
NT-PROBNP SERPL-SCNC: 36 PG/ML — SIGNIFICANT CHANGE UP (ref 0–300)
PLATELET # BLD AUTO: 250 K/UL — SIGNIFICANT CHANGE UP (ref 130–400)
POTASSIUM SERPL-MCNC: 4.3 MMOL/L — SIGNIFICANT CHANGE UP (ref 3.5–5)
POTASSIUM SERPL-SCNC: 4.3 MMOL/L — SIGNIFICANT CHANGE UP (ref 3.5–5)
PROT SERPL-MCNC: 7.2 G/DL — SIGNIFICANT CHANGE UP (ref 6–8)
RBC # BLD: 4.88 M/UL — SIGNIFICANT CHANGE UP (ref 4.2–5.4)
RBC # FLD: 13.3 % — SIGNIFICANT CHANGE UP (ref 11.5–14.5)
SODIUM SERPL-SCNC: 142 MMOL/L — SIGNIFICANT CHANGE UP (ref 135–146)
TROPONIN T SERPL-MCNC: <0.01 NG/ML — SIGNIFICANT CHANGE UP
WBC # BLD: 7.17 K/UL — SIGNIFICANT CHANGE UP (ref 4.8–10.8)
WBC # FLD AUTO: 7.17 K/UL — SIGNIFICANT CHANGE UP (ref 4.8–10.8)

## 2023-02-15 PROCEDURE — 85025 COMPLETE CBC W/AUTO DIFF WBC: CPT

## 2023-02-15 PROCEDURE — 36415 COLL VENOUS BLD VENIPUNCTURE: CPT

## 2023-02-15 PROCEDURE — 93005 ELECTROCARDIOGRAM TRACING: CPT

## 2023-02-15 PROCEDURE — 99283 EMERGENCY DEPT VISIT LOW MDM: CPT

## 2023-02-15 PROCEDURE — 83735 ASSAY OF MAGNESIUM: CPT

## 2023-02-15 PROCEDURE — 80053 COMPREHEN METABOLIC PANEL: CPT

## 2023-02-15 PROCEDURE — 84484 ASSAY OF TROPONIN QUANT: CPT

## 2023-02-15 PROCEDURE — 83880 ASSAY OF NATRIURETIC PEPTIDE: CPT

## 2023-02-15 PROCEDURE — 99285 EMERGENCY DEPT VISIT HI MDM: CPT

## 2023-02-15 PROCEDURE — 93010 ELECTROCARDIOGRAM REPORT: CPT

## 2023-02-15 NOTE — ED PROVIDER NOTE - TEST CONSIDERED BUT NOT PERFORMED
Dimer considered but patient not tachycardic or hypoxic, no PE risk factors, technically PERC 0. Tests Considered But Not Performed

## 2023-02-15 NOTE — ED PROVIDER NOTE - CONSIDERATION OF ADMISSION OBSERVATION
Work up in ED negative, patient feeling better, has stressors currently. Plan was for obs but patient decline and signed out AMA. Consideration of Admission/Observation

## 2023-02-15 NOTE — ED PROVIDER NOTE - NS ED ATTENDING STATEMENT MOD
This was a shared visit with the VANITA. I reviewed and verified the documentation and independently performed the documented:

## 2023-02-15 NOTE — ED PROVIDER NOTE - PATIENT PORTAL LINK FT
You can access the FollowMyHealth Patient Portal offered by [facility] by registering at the following website: [email or web address]. By joining [employer]’s FollowMyHealth portal, you will also be able to view your health information using other applications (apps) compatible with our system.

## 2023-02-15 NOTE — ED PROVIDER NOTE - CLINICAL SUMMARY MEDICAL DECISION MAKING FREE TEXT BOX
Patient presented with palpitations, dyspnea, lightheadedness s/p recent stressor of family member being hospitalized. Otherwise afebrile, HD stable, well appearing. EKG obtained and grossly non-ischemic. Obtained labs which were grossly unremarkable including no significant leukocytosis, anemia, signs of dehydration/GUILLERMO, transaminitis or significant electrolyte abnormalities. Trop negative. Chest xray negative for pneumothorax, pneumonia, widened mediastinum, evidence of rib fractures, enlarged cardiac silhouette or any other emergent pathologies. Patient remained without recurrence of chest pain or abnormalities during monitoring in ED. Ambulatory without difficulty, tolerates PO. HEART Score 1, PERC 0/no PE risk factors, no concern clinically for dissection. Offered obs for ACS rule out but patient declined and will sign out AMA.    The patient wishes to leave against medical advice.  I have discussed the risks, benefits and alternatives (including the possibility of worsening of disease, pain, permanent disability, and/or death) with the patient and his/her family (if available).  The patient voices understanding of these risks, benefits, and alternatives and still wishes to sign out against medical advice.  The patient is awake, alert, oriented  x 3 and has demonstrated capacity to refuse/direct care.  I have advised the patient that they can and should return immediately should they develop any worse/different/additional symptoms, or if they change their mind and want to continue their care.

## 2023-02-15 NOTE — ED PROVIDER NOTE - OBJECTIVE STATEMENT
52-year-old female with past medical history of MS presents to the ED with weakness lightheadedness and shortness of breath.  Patient admits has been very stressed because  is very ill.  Symptoms have been on and off for a while.  No chest pain, fever, cough, abdominal pain, vomiting or diarrhea.

## 2023-02-15 NOTE — ED PROVIDER NOTE - DIFFERENTIAL DIAGNOSIS
Palpitations, shortness of breath, r/o ACS, pneumothorax, pneumonia, fluid overload, electrolyte abnormality, dehydration, PE Differential Diagnosis

## 2023-02-22 DIAGNOSIS — R25.1 TREMOR, UNSPECIFIED: ICD-10-CM

## 2023-03-02 ENCOUNTER — APPOINTMENT (OUTPATIENT)
Dept: ELECTROPHYSIOLOGY | Facility: CLINIC | Age: 53
End: 2023-03-02
Payer: COMMERCIAL

## 2023-03-02 VITALS
WEIGHT: 155 LBS | BODY MASS INDEX: 23.49 KG/M2 | HEART RATE: 73 BPM | HEIGHT: 68 IN | SYSTOLIC BLOOD PRESSURE: 132 MMHG | DIASTOLIC BLOOD PRESSURE: 77 MMHG | RESPIRATION RATE: 14 BRPM | TEMPERATURE: 97 F

## 2023-03-02 PROCEDURE — 93000 ELECTROCARDIOGRAM COMPLETE: CPT

## 2023-03-02 PROCEDURE — 99214 OFFICE O/P EST MOD 30 MIN: CPT | Mod: 25

## 2023-03-02 RX ORDER — PROPRANOLOL HYDROCHLORIDE 20 MG/1
20 TABLET ORAL
Qty: 30 | Refills: 5 | Status: COMPLETED | COMMUNITY
Start: 2023-02-22 | End: 2023-03-02

## 2023-03-15 NOTE — ADDENDUM
[FreeTextEntry1] : ISarah assisted in documentation on 03/13/2023 acting as a scribe for Dr. Pamela Bell.\par

## 2023-03-15 NOTE — CARDIOLOGY SUMMARY
[de-identified] : 2/16/22 NSR (HR 68 bpm), PVC [de-identified] : 11/2/21-11/22/21 25 sx recorded that correlate with isolated PVCs. PVC burden 1 %. One episode of SVT lasting 14 sec.  [de-identified] :  CTA Cardiac 3/22/2017 Coronary Ca score 0. Normal coronary arteries. Small hiatal hernia.  [de-identified] : 8/26/2020 Stress ECHO Rob, 7:52, 89% MPHR, 10.4 METS\par EF 55-65%. Equivocal/borderline ST wave changes in response to stress. No clinical or 2D echo evidence of exercise-induced ischemia at 89% MPHR.

## 2023-03-15 NOTE — ASSESSMENT
[FreeTextEntry1] : # Palpitations\par # SVT\par # PVC\par \par \par -Prior MCOT showed symptoms with PVC. In pre-con PVCs at that time.\par - Patient now feels more symptoms. Unsure if it's due to PVC or something else. Repeat MCOT for one month for a symptom assessment.\par - Prior management of PVC included conservative management with PRN meds.\par - Return in one month.\par

## 2023-03-15 NOTE — HISTORY OF PRESENT ILLNESS
[FreeTextEntry1] : 52 yo F  who presents for initial evaluation of palpitations. She had a monitor with her cardiologist during which she recorded 25 symptoms, all that correlated to monomorphic isolated PVCs. PVC burden 1%. She also had one episode of 14 sec of PSVT.  She notices worse palpitations with alcohol. She denies chest pain, dizziness, lightheadedness, presyncope or syncope. \par \par 10/27: More episodes of Palpitations- states that they are associated with mental stress related to family/work she is being going on.\par \par 12/15/22: \par Ms. Bolden has given me verbal authorization to provide the tele services\par Verbal consent given on 012/15/2022  by the patient.\par \par \par Feels fine. \par (3/2/23) had a COVID after December appointment. Continues to have more palpitation after that. \par \par EKG (3/2/23) SR.\par EKG (10/27): SR 69, , QRSd 94, QTc 393\par Card: Dr. Avitia\par

## 2023-04-20 ENCOUNTER — APPOINTMENT (OUTPATIENT)
Dept: CARDIOLOGY | Facility: CLINIC | Age: 53
End: 2023-04-20

## 2023-05-03 ENCOUNTER — APPOINTMENT (OUTPATIENT)
Dept: ELECTROPHYSIOLOGY | Facility: CLINIC | Age: 53
End: 2023-05-03

## 2023-05-04 ENCOUNTER — APPOINTMENT (OUTPATIENT)
Dept: ELECTROPHYSIOLOGY | Facility: CLINIC | Age: 53
End: 2023-05-04

## 2023-05-19 ENCOUNTER — APPOINTMENT (OUTPATIENT)
Dept: CARDIOLOGY | Facility: CLINIC | Age: 53
End: 2023-05-19

## 2023-08-11 ENCOUNTER — NON-APPOINTMENT (OUTPATIENT)
Age: 53
End: 2023-08-11

## 2023-09-14 ENCOUNTER — APPOINTMENT (OUTPATIENT)
Dept: ELECTROPHYSIOLOGY | Facility: CLINIC | Age: 53
End: 2023-09-14
Payer: COMMERCIAL

## 2023-09-14 VITALS
HEIGHT: 68 IN | DIASTOLIC BLOOD PRESSURE: 65 MMHG | TEMPERATURE: 98 F | WEIGHT: 165 LBS | BODY MASS INDEX: 25.01 KG/M2 | SYSTOLIC BLOOD PRESSURE: 103 MMHG | HEART RATE: 59 BPM

## 2023-09-14 PROCEDURE — 99215 OFFICE O/P EST HI 40 MIN: CPT | Mod: 25

## 2023-09-14 PROCEDURE — 93000 ELECTROCARDIOGRAM COMPLETE: CPT

## 2023-09-14 RX ORDER — FAMOTIDINE 10 MG/1
10 TABLET, FILM COATED ORAL DAILY
Refills: 0 | Status: ACTIVE | COMMUNITY

## 2023-09-14 RX ORDER — ACETAMINOPHEN 500 MG/1
500 TABLET, COATED ORAL DAILY
Refills: 0 | Status: ACTIVE | COMMUNITY

## 2023-09-14 RX ORDER — CYANOCOBALAMIN 1000 UG/ML
1000 INJECTION INTRAMUSCULAR; SUBCUTANEOUS
Refills: 0 | Status: ACTIVE | COMMUNITY

## 2023-09-14 RX ORDER — BACILLUS COAGULANS/INULIN 1B-250 MG
CAPSULE ORAL
Refills: 0 | Status: ACTIVE | COMMUNITY

## 2023-09-14 RX ORDER — MULTIVIT-MIN/FOLIC ACID/LUTEIN 500-250MCG
TABLET,CHEWABLE ORAL DAILY
Refills: 0 | Status: ACTIVE | COMMUNITY

## 2023-09-18 ENCOUNTER — RX RENEWAL (OUTPATIENT)
Age: 53
End: 2023-09-18

## 2023-09-18 ENCOUNTER — APPOINTMENT (OUTPATIENT)
Dept: NEUROLOGY | Facility: CLINIC | Age: 53
End: 2023-09-18
Payer: COMMERCIAL

## 2023-09-18 VITALS
HEIGHT: 68 IN | DIASTOLIC BLOOD PRESSURE: 70 MMHG | WEIGHT: 165 LBS | SYSTOLIC BLOOD PRESSURE: 103 MMHG | HEART RATE: 71 BPM | BODY MASS INDEX: 25.01 KG/M2

## 2023-09-18 DIAGNOSIS — G35 MULTIPLE SCLEROSIS: ICD-10-CM

## 2023-09-18 PROCEDURE — 99214 OFFICE O/P EST MOD 30 MIN: CPT

## 2023-09-18 RX ORDER — NAPROXEN 500 MG/1
500 TABLET ORAL
Qty: 60 | Refills: 0 | Status: ACTIVE | COMMUNITY
Start: 2023-06-27

## 2023-09-18 RX ORDER — FLUCONAZOLE 150 MG/1
150 TABLET ORAL
Qty: 1 | Refills: 0 | Status: ACTIVE | COMMUNITY
Start: 2023-06-09

## 2023-09-18 RX ORDER — MOLNUPIRAVIR 200 MG/1
200 CAPSULE ORAL
Qty: 40 | Refills: 0 | Status: ACTIVE | COMMUNITY
Start: 2023-08-10

## 2023-09-18 RX ORDER — CIPROFLOXACIN HYDROCHLORIDE 500 MG/1
500 TABLET, FILM COATED ORAL
Qty: 6 | Refills: 0 | Status: ACTIVE | COMMUNITY
Start: 2023-06-09

## 2023-09-18 RX ORDER — PROPRANOLOL HYDROCHLORIDE 20 MG/1
20 TABLET ORAL TWICE DAILY
Qty: 90 | Refills: 0 | Status: ACTIVE | COMMUNITY
Start: 2023-09-18 | End: 1900-01-01

## 2023-10-02 ENCOUNTER — APPOINTMENT (OUTPATIENT)
Dept: CARDIOLOGY | Facility: CLINIC | Age: 53
End: 2023-10-02

## 2023-10-19 ENCOUNTER — APPOINTMENT (OUTPATIENT)
Dept: ELECTROPHYSIOLOGY | Facility: CLINIC | Age: 53
End: 2023-10-19

## 2024-07-22 ENCOUNTER — APPOINTMENT (OUTPATIENT)
Dept: NEUROLOGY | Facility: CLINIC | Age: 54
End: 2024-07-22
Payer: COMMERCIAL

## 2024-07-22 VITALS
BODY MASS INDEX: 24.86 KG/M2 | WEIGHT: 164 LBS | HEART RATE: 66 BPM | HEIGHT: 68 IN | DIASTOLIC BLOOD PRESSURE: 79 MMHG | SYSTOLIC BLOOD PRESSURE: 120 MMHG

## 2024-07-22 DIAGNOSIS — G35 MULTIPLE SCLEROSIS: ICD-10-CM

## 2024-07-22 PROCEDURE — G2211 COMPLEX E/M VISIT ADD ON: CPT | Mod: NC

## 2024-07-22 PROCEDURE — 99214 OFFICE O/P EST MOD 30 MIN: CPT

## 2024-07-23 RX ORDER — FAMOTIDINE 10 MG/1
10 TABLET, FILM COATED ORAL
Refills: 0 | Status: ACTIVE | COMMUNITY

## 2024-07-23 NOTE — HISTORY OF PRESENT ILLNESS
[FreeTextEntry1] : Since last visit she says he anxiety/depression was improved slightly however recently due too family issues has significant stress again.  She feels she has been having issues with her vision mosr specifically with reading.  She also had 2 episodes recently in which she had difficulty expressing herself.  From last visit on 9/18/2023: Ms. Bolden is a 51 yo woman who presents of follow up for her MS and cervical radiculopathy.   She continues noticing fatiguing of her muscles easily and SOB easily with little exertion.  She is getting frequent palpitations and after evaluation with EPS was found to have SVT and 1% PVC burden.  She opted for metoprolol as needed. She has become more fatigued recently mostly due too working more at work, and taking care of her family.  She had covid recently and since covid she has been having more memory issues.  She has been dealing with palpitations and had MCOT showing pvc's and had another MCOT placed recently which she is still wearing.  She is c/o pain in her feet and has also noticed some stress incontinence and one episode of incontinence unrelated to stress.      
[FreeTextEntry1] : Since last visit she says he anxiety/depression was improved slightly however recently due too family issues has significant stress again.  She feels she has been having issues with her vision mosr specifically with reading.  She also had 2 episodes recently in which she had difficulty expressing herself.  From last visit on 9/18/2023: Ms. Bolden is a 53 yo woman who presents of follow up for her MS and cervical radiculopathy.   She continues noticing fatiguing of her muscles easily and SOB easily with little exertion.  She is getting frequent palpitations and after evaluation with EPS was found to have SVT and 1% PVC burden.  She opted for metoprolol as needed. She has become more fatigued recently mostly due too working more at work, and taking care of her family.  She had covid recently and since covid she has been having more memory issues.  She has been dealing with palpitations and had MCOT showing pvc's and had another MCOT placed recently which she is still wearing.  She is c/o pain in her feet and has also noticed some stress incontinence and one episode of incontinence unrelated to stress.      
01-Oct-2019

## 2024-07-23 NOTE — DISCUSSION/SUMMARY
[FreeTextEntry1] : Ms. Bolden is a 52 yo woman with MS with worsening fatigue, cognitive issues and some incontinence and pain in feet.  PLAN: 1. MRI Brain/Cervical/Thoracic spine w/o RULA 2. Psychiatry/Therapist f/u 3. Encouraged exercise 4. f/u in 6 months

## 2024-07-23 NOTE — DISCUSSION/SUMMARY
[FreeTextEntry1] : Ms. Bolden is a 54 yo woman with MS with worsening fatigue, cognitive issues and some incontinence and pain in feet.  PLAN: 1. MRI Brain/Cervical/Thoracic spine w/o RULA 2. Psychiatry/Therapist f/u 3. Encouraged exercise 4. f/u in 6 months

## 2024-08-07 ENCOUNTER — APPOINTMENT (OUTPATIENT)
Dept: CARDIOLOGY | Facility: CLINIC | Age: 54
End: 2024-08-07

## 2024-08-07 PROBLEM — I47.10 PAROXYSMAL SVT (SUPRAVENTRICULAR TACHYCARDIA): Status: ACTIVE | Noted: 2018-07-13

## 2024-08-07 PROCEDURE — 99214 OFFICE O/P EST MOD 30 MIN: CPT | Mod: 25

## 2024-08-07 PROCEDURE — 93000 ELECTROCARDIOGRAM COMPLETE: CPT

## 2024-08-11 NOTE — PHYSICAL EXAM
[General Appearance - Well Developed] : well developed [Normal Appearance] : normal appearance [Well Groomed] : well groomed [General Appearance - Well Nourished] : well nourished [No Deformities] : no deformities [General Appearance - In No Acute Distress] : no acute distress [Normal Conjunctiva] : the conjunctiva exhibited no abnormalities [Eyelids - No Xanthelasma] : the eyelids demonstrated no xanthelasmas [Normal Oral Mucosa] : normal oral mucosa [No Oral Pallor] : no oral pallor [No Oral Cyanosis] : no oral cyanosis [Respiration, Rhythm And Depth] : normal respiratory rhythm and effort [Exaggerated Use Of Accessory Muscles For Inspiration] : no accessory muscle use [Auscultation Breath Sounds / Voice Sounds] : lungs were clear to auscultation bilaterally [Abdomen Soft] : soft [Abdomen Tenderness] : non-tender [Abdomen Mass (___ Cm)] : no abdominal mass palpated [Abnormal Walk] : normal gait [Cyanosis, Localized] : no localized cyanosis [Nail Clubbing] : no clubbing of the fingernails [Petechial Hemorrhages (___cm)] : no petechial hemorrhages [Skin Color & Pigmentation] : normal skin color and pigmentation [] : no rash [No Venous Stasis] : no venous stasis [Skin Lesions] : no skin lesions [No Skin Ulcers] : no skin ulcer [No Xanthoma] : no  xanthoma was observed [Oriented To Time, Place, And Person] : oriented to person, place, and time [Affect] : the affect was normal [Mood] : the mood was normal [No Anxiety] : not feeling anxious [Normal Rate] : normal [Rhythm Regular] : regular [No Murmur] : no murmurs heard [1+] : left 1+ [No Pitting Edema] : no pitting edema present [FreeTextEntry1] : No JVD

## 2024-08-11 NOTE — HISTORY OF PRESENT ILLNESS
[FreeTextEntry1] : The patient has MS and is followed by neurology . She has had episodes of dizziness and lightheadedness and pre syncope . It passes after 5 minutes . She has had chest pain sharp which are fleeting only .  She has had no CAD on previous CTA but that was in 2017

## 2024-08-11 NOTE — ASSESSMENT
[FreeTextEntry1] : The patient has had atypical chest pain . She has had periods of lightheadedness and pre syncope . The patient  has had palpitations . The patient has MS which clouds other issues as well.

## 2024-08-11 NOTE — CARDIOLOGY SUMMARY
[___] : [unfilled] [de-identified] : 8- NSR NS ST-T changes  12- NSR NS ST-T changes  1- NSR NS T wave change.  8-7-2024 NSR Normal ECG .  [de-identified] : 11- PAC's PVC's both 1%  one short episode of AT SVT at only 112/minute none fast  [de-identified] : 8- ETT Echo completed 7 minutes and 42 seconds Trace MR mld TR No ischemia .

## 2024-08-11 NOTE — CARDIOLOGY SUMMARY
[___] : [unfilled] [de-identified] : 8- NSR NS ST-T changes  12- NSR NS ST-T changes  1- NSR NS T wave change.  8-7-2024 NSR Normal ECG .  [de-identified] : 11- PAC's PVC's both 1%  one short episode of AT SVT at only 112/minute none fast  [de-identified] : 8- ETT Echo completed 7 minutes and 42 seconds Trace MR mld TR No ischemia .

## 2024-09-26 ENCOUNTER — OUTPATIENT (OUTPATIENT)
Dept: OUTPATIENT SERVICES | Facility: HOSPITAL | Age: 54
LOS: 1 days | End: 2024-09-26
Payer: COMMERCIAL

## 2024-09-26 DIAGNOSIS — M79.672 PAIN IN LEFT FOOT: ICD-10-CM

## 2024-09-26 DIAGNOSIS — Z00.8 ENCOUNTER FOR OTHER GENERAL EXAMINATION: ICD-10-CM

## 2024-09-26 PROCEDURE — 76882 US LMTD JT/FCL EVL NVASC XTR: CPT | Mod: 26,LT

## 2024-09-26 PROCEDURE — 76882 US LMTD JT/FCL EVL NVASC XTR: CPT | Mod: LT

## 2024-09-27 DIAGNOSIS — M79.672 PAIN IN LEFT FOOT: ICD-10-CM

## 2024-10-16 ENCOUNTER — APPOINTMENT (OUTPATIENT)
Dept: CARDIOLOGY | Facility: CLINIC | Age: 54
End: 2024-10-16
Payer: COMMERCIAL

## 2024-10-16 DIAGNOSIS — I47.10 SUPRAVENTRICULAR TACHYCARDIA, UNSPECIFIED: ICD-10-CM

## 2024-10-16 DIAGNOSIS — I10 ESSENTIAL (PRIMARY) HYPERTENSION: ICD-10-CM

## 2024-10-16 DIAGNOSIS — E78.5 HYPERLIPIDEMIA, UNSPECIFIED: ICD-10-CM

## 2024-10-16 DIAGNOSIS — R00.2 PALPITATIONS: ICD-10-CM

## 2024-10-16 PROCEDURE — 93244 EXT ECG>48HR<7D REV&INTERPJ: CPT

## 2024-10-24 ENCOUNTER — APPOINTMENT (OUTPATIENT)
Dept: CARDIOLOGY | Facility: CLINIC | Age: 54
End: 2024-10-24

## 2024-10-24 DIAGNOSIS — R07.9 CHEST PAIN, UNSPECIFIED: ICD-10-CM

## 2024-10-24 DIAGNOSIS — R00.2 PALPITATIONS: ICD-10-CM

## 2024-10-24 DIAGNOSIS — I10 ESSENTIAL (PRIMARY) HYPERTENSION: ICD-10-CM

## 2024-10-24 PROCEDURE — 93306 TTE W/DOPPLER COMPLETE: CPT

## 2024-11-21 ENCOUNTER — APPOINTMENT (OUTPATIENT)
Dept: CARDIOLOGY | Facility: CLINIC | Age: 54
End: 2024-11-21

## 2025-01-17 ENCOUNTER — APPOINTMENT (OUTPATIENT)
Dept: CARDIOLOGY | Facility: CLINIC | Age: 55
End: 2025-01-17
Payer: COMMERCIAL

## 2025-01-17 VITALS — HEART RATE: 56 BPM | SYSTOLIC BLOOD PRESSURE: 126 MMHG | DIASTOLIC BLOOD PRESSURE: 70 MMHG

## 2025-01-17 VITALS — WEIGHT: 169 LBS | BODY MASS INDEX: 25.61 KG/M2 | HEIGHT: 68 IN

## 2025-01-17 DIAGNOSIS — G35 MULTIPLE SCLEROSIS: ICD-10-CM

## 2025-01-17 DIAGNOSIS — R13.10 DYSPHAGIA, UNSPECIFIED: ICD-10-CM

## 2025-01-17 DIAGNOSIS — I10 ESSENTIAL (PRIMARY) HYPERTENSION: ICD-10-CM

## 2025-01-17 DIAGNOSIS — G31.84 MILD COGNITIVE IMPAIRMENT, SO STATED: ICD-10-CM

## 2025-01-17 PROCEDURE — 99214 OFFICE O/P EST MOD 30 MIN: CPT | Mod: 25

## 2025-01-17 PROCEDURE — 93000 ELECTROCARDIOGRAM COMPLETE: CPT

## 2025-04-17 ENCOUNTER — NON-APPOINTMENT (OUTPATIENT)
Age: 55
End: 2025-04-17

## 2025-05-15 ENCOUNTER — APPOINTMENT (OUTPATIENT)
Dept: OBGYN | Facility: CLINIC | Age: 55
End: 2025-05-15

## 2025-05-20 ENCOUNTER — NON-APPOINTMENT (OUTPATIENT)
Age: 55
End: 2025-05-20

## 2025-05-21 ENCOUNTER — APPOINTMENT (OUTPATIENT)
Dept: NEUROLOGY | Facility: CLINIC | Age: 55
End: 2025-05-21
Payer: COMMERCIAL

## 2025-05-21 VITALS
BODY MASS INDEX: 25.61 KG/M2 | WEIGHT: 169 LBS | HEIGHT: 68 IN | SYSTOLIC BLOOD PRESSURE: 140 MMHG | OXYGEN SATURATION: 99 % | DIASTOLIC BLOOD PRESSURE: 79 MMHG | HEART RATE: 64 BPM

## 2025-05-21 DIAGNOSIS — M54.12 RADICULOPATHY, CERVICAL REGION: ICD-10-CM

## 2025-05-21 DIAGNOSIS — G35 MULTIPLE SCLEROSIS: ICD-10-CM

## 2025-05-21 DIAGNOSIS — M54.50 LOW BACK PAIN, UNSPECIFIED: ICD-10-CM

## 2025-05-21 PROCEDURE — 99215 OFFICE O/P EST HI 40 MIN: CPT

## 2025-05-21 PROCEDURE — G2211 COMPLEX E/M VISIT ADD ON: CPT | Mod: NC

## 2025-06-04 ENCOUNTER — NON-APPOINTMENT (OUTPATIENT)
Age: 55
End: 2025-06-04

## 2025-06-05 ENCOUNTER — APPOINTMENT (OUTPATIENT)
Dept: OBGYN | Facility: CLINIC | Age: 55
End: 2025-06-05

## 2025-07-02 PROBLEM — Z32.01 POSITIVE URINE PREGNANCY TEST: Status: RESOLVED | Noted: 2019-07-22 | Resolved: 2025-07-02

## 2025-07-10 ENCOUNTER — APPOINTMENT (OUTPATIENT)
Dept: CARDIOLOGY | Facility: CLINIC | Age: 55
End: 2025-07-10
Payer: COMMERCIAL

## 2025-07-10 VITALS — HEIGHT: 68 IN | WEIGHT: 169 LBS | TEMPERATURE: 97.2 F | BODY MASS INDEX: 25.61 KG/M2

## 2025-07-10 VITALS — HEART RATE: 50 BPM | DIASTOLIC BLOOD PRESSURE: 79 MMHG | SYSTOLIC BLOOD PRESSURE: 134 MMHG

## 2025-07-10 PROCEDURE — 99214 OFFICE O/P EST MOD 30 MIN: CPT | Mod: 25

## 2025-07-10 PROCEDURE — 93000 ELECTROCARDIOGRAM COMPLETE: CPT

## 2025-08-01 ENCOUNTER — APPOINTMENT (OUTPATIENT)
Dept: CARDIOLOGY | Facility: CLINIC | Age: 55
End: 2025-08-01